# Patient Record
Sex: FEMALE | Race: WHITE | NOT HISPANIC OR LATINO | Employment: FULL TIME | URBAN - NONMETROPOLITAN AREA
[De-identification: names, ages, dates, MRNs, and addresses within clinical notes are randomized per-mention and may not be internally consistent; named-entity substitution may affect disease eponyms.]

---

## 2018-03-29 ENCOUNTER — TRANSCRIBE ORDERS (OUTPATIENT)
Dept: ADMINISTRATIVE | Facility: HOSPITAL | Age: 58
End: 2018-03-29

## 2018-03-29 DIAGNOSIS — Z12.39 SCREENING BREAST EXAMINATION: Primary | ICD-10-CM

## 2018-06-25 ENCOUNTER — HOSPITAL ENCOUNTER (OUTPATIENT)
Dept: MAMMOGRAPHY | Facility: HOSPITAL | Age: 58
Discharge: HOME | End: 2018-06-25
Admitting: NURSE PRACTITIONER

## 2018-06-25 DIAGNOSIS — Z12.39 SCREENING BREAST EXAMINATION: ICD-10-CM

## 2018-06-25 PROCEDURE — 77063 BREAST TOMOSYNTHESIS BI: CPT

## 2018-06-25 PROCEDURE — 77067 SCR MAMMO BI INCL CAD: CPT

## 2019-04-01 ENCOUNTER — OFFICE VISIT (OUTPATIENT)
Dept: SURGERY | Facility: CLINIC | Age: 59
End: 2019-04-01

## 2019-04-01 VITALS
TEMPERATURE: 98.2 F | SYSTOLIC BLOOD PRESSURE: 156 MMHG | BODY MASS INDEX: 38.89 KG/M2 | HEIGHT: 66 IN | WEIGHT: 242 LBS | OXYGEN SATURATION: 97 % | HEART RATE: 112 BPM | DIASTOLIC BLOOD PRESSURE: 106 MMHG

## 2019-04-01 DIAGNOSIS — K62.5 RECTAL BLEED: Primary | ICD-10-CM

## 2019-04-01 PROCEDURE — 99204 OFFICE O/P NEW MOD 45 MIN: CPT | Performed by: SURGERY

## 2019-04-01 RX ORDER — LORATADINE 10 MG/1
10 TABLET ORAL DAILY
COMMUNITY

## 2019-04-01 RX ORDER — POLYETHYLENE GLYCOL 3350 17 G/17G
238 POWDER, FOR SOLUTION ORAL ONCE
Qty: 14 PACKET | Refills: 0 | Status: SHIPPED | OUTPATIENT
Start: 2019-04-01 | End: 2019-04-01

## 2019-04-01 RX ORDER — BISACODYL 5 MG/1
5 TABLET, DELAYED RELEASE ORAL DAILY
Qty: 4 TABLET | Refills: 0 | Status: SHIPPED | OUTPATIENT
Start: 2019-04-01 | End: 2020-11-18

## 2019-04-01 NOTE — PROGRESS NOTES
Patient: Kellie Parks    YOB: 1960    Date: 04/01/2019    Primary Care Provider: Machelle Cooney APRN    Chief Complaint   Patient presents with   • Rectal Bleeding       SUBJECTIVE:    History of present illness: Patient here with complaint of rectal bleeding intermittently over the last month or so.  It is now worsening and is occurring daily.  Bright red blood.  It has occurred in the past as well.  She occasionally has some relative constipation.  Minimal lower abdominal pain occasionally also.  She has had a history of thrombosed external hemorrhoids but is complaining of no anal pain or mass.  She has a strong family history of colon cancer in her father and son    The following portions of the patient's history were reviewed and updated as appropriate: allergies, current medications, past family history, past medical history, past social history, past surgical history and problem list.      Review of Systems   Constitutional: Negative for chills, fever and unexpected weight change.   HENT: Negative for hearing loss, trouble swallowing and voice change.    Eyes: Negative for visual disturbance.   Respiratory: Negative for apnea, cough, chest tightness, shortness of breath and wheezing.    Cardiovascular: Negative for chest pain, palpitations and leg swelling.   Gastrointestinal: Positive for anal bleeding. Negative for abdominal distention, abdominal pain, blood in stool, constipation, diarrhea, nausea, rectal pain and vomiting.   Endocrine: Negative for cold intolerance and heat intolerance.   Genitourinary: Negative for difficulty urinating, dysuria and flank pain.   Musculoskeletal: Negative for back pain and gait problem.   Skin: Negative for color change, rash and wound.   Neurological: Negative for dizziness, syncope, speech difficulty, weakness, light-headedness, numbness and headaches.   Hematological: Negative for adenopathy. Does not bruise/bleed easily.   Psychiatric/Behavioral:  "Negative for confusion. The patient is not nervous/anxious.        History:  Past Medical History:   Diagnosis Date   • Acid reflux    • Arthritis    • Hypertension    • Hypothyroidism        Past Surgical History:   Procedure Laterality Date   • HYSTERECTOMY  2008       Family History   Problem Relation Age of Onset   • Arthritis Mother    • Osteoporosis Mother    • Hypertension Mother    • Prostate cancer Father 76         with bone mets   • Hypertension Father    • Colon cancer Father 75   • Colon cancer Son 17   • Kidney failure Son        Social History     Tobacco Use   • Smoking status: Never Smoker   Substance Use Topics   • Alcohol use: No   • Drug use: No       Allergies:  No Known Allergies    Medications:    Current Outpatient Medications:   •  hydrochlorothiazide (MICROZIDE) 12.5 MG capsule, Take 12.5 mg by mouth daily., Disp: , Rfl:   •  levothyroxine (SYNTHROID, LEVOTHROID) 150 MCG tablet, Take 150 mcg by mouth daily., Disp: , Rfl:   •  loratadine (CLARITIN) 10 MG tablet, Take 10 mg by mouth Daily., Disp: , Rfl:     OBJECTIVE:    Vital Signs:   Vitals:    04/01/19 1332   BP: (!) 156/106   Pulse: 112   Temp: 98.2 °F (36.8 °C)   SpO2: 97%   Weight: 110 kg (242 lb)   Height: 167.6 cm (66\")       Physical Exam:   General Appearance:    Alert, cooperative, in no acute distress   Head:    Normocephalic, without obvious abnormality, atraumatic   Eyes:            Normal.  No scleral icterus.  PERRLA    Lungs:     Clear to auscultation,respirations regular, even and                  unlabored    Heart:    Regular rhythm and normal rate, normal S1 and S2, no            murmur   Abdomen:     Normal bowel sounds, no masses, no organomegaly, soft        non-tender, non-distended, no guarding,    Extremities:   Moves all extremities well, no edema, no cyanosis, no             redness   Skin:   No bleeding, bruising or rash   Neurologic:   Normal without gross deficits.   Psychiatric: No evidence of depression or " anxiety        Results Review:   None previous endoscopies were reviewed however.    Review of Systems was reviewed and confirmed as accurate today.    ASSESSMENT/PLAN:    1. Rectal bleed        Patient with rectal bleeding most likely associated with hemorrhoids.  She has a strong family history of colon cancer including her son who developed colon cancer at 17 years old.  She is concerned about cancer as well.  Her previous colonoscopy was performed about 2-1/2 years ago but had a poor prep at that time.  I offered her colonoscopy and she wishes to proceed with a colonoscopy.  She understands the procedure as well as the risks of bleeding and perforation and understands the ramifications of these potential complications and she wishes to proceed.  For now she wants to hold off on any hemorrhoid banding if indicated.  Also, recommend a fiber supplement of choice daily.    Electronically signed by Cristofer Shah MD  04/01/19 1:27 PM  Portions of this note has been scribed for Cristofer Shah MD by Leny Peoples. 4/1/2019  1:59 PM

## 2019-04-08 ENCOUNTER — OUTSIDE FACILITY SERVICE (OUTPATIENT)
Dept: SURGERY | Facility: CLINIC | Age: 59
End: 2019-04-08

## 2019-04-08 PROCEDURE — 45380 COLONOSCOPY AND BIOPSY: CPT | Performed by: SURGERY

## 2019-06-18 ENCOUNTER — TRANSCRIBE ORDERS (OUTPATIENT)
Dept: ADMINISTRATIVE | Facility: HOSPITAL | Age: 59
End: 2019-06-18

## 2019-06-18 DIAGNOSIS — Z12.39 ENCOUNTER FOR SCREENING FOR MALIGNANT NEOPLASM OF BREAST: Primary | ICD-10-CM

## 2019-09-18 DIAGNOSIS — M25.562 ARTHRALGIA OF LEFT KNEE: Primary | ICD-10-CM

## 2019-09-19 ENCOUNTER — OFFICE VISIT (OUTPATIENT)
Dept: ORTHOPEDIC SURGERY | Facility: CLINIC | Age: 59
End: 2019-09-19

## 2019-09-19 VITALS — BODY MASS INDEX: 38.89 KG/M2 | RESPIRATION RATE: 18 BRPM | HEIGHT: 66 IN | WEIGHT: 242 LBS

## 2019-09-19 DIAGNOSIS — M25.562 ARTHRALGIA OF LEFT KNEE: Primary | ICD-10-CM

## 2019-09-19 PROCEDURE — 20610 DRAIN/INJ JOINT/BURSA W/O US: CPT | Performed by: ORTHOPAEDIC SURGERY

## 2019-09-19 PROCEDURE — 99203 OFFICE O/P NEW LOW 30 MIN: CPT | Performed by: ORTHOPAEDIC SURGERY

## 2019-09-19 RX ORDER — TRIAMCINOLONE ACETONIDE 40 MG/ML
40 INJECTION, SUSPENSION INTRA-ARTICULAR; INTRAMUSCULAR
Status: COMPLETED | OUTPATIENT
Start: 2019-09-19 | End: 2019-09-19

## 2019-09-19 RX ORDER — LIDOCAINE HYDROCHLORIDE 10 MG/ML
2 INJECTION, SOLUTION EPIDURAL; INFILTRATION; INTRACAUDAL; PERINEURAL
Status: COMPLETED | OUTPATIENT
Start: 2019-09-19 | End: 2019-09-19

## 2019-09-19 RX ADMIN — LIDOCAINE HYDROCHLORIDE 2 ML: 10 INJECTION, SOLUTION EPIDURAL; INFILTRATION; INTRACAUDAL; PERINEURAL at 14:26

## 2019-09-19 RX ADMIN — TRIAMCINOLONE ACETONIDE 40 MG: 40 INJECTION, SUSPENSION INTRA-ARTICULAR; INTRAMUSCULAR at 14:26

## 2019-09-19 NOTE — PROGRESS NOTES
Subjective   Patient ID: Kellie Parks is a 59 y.o. female  Pain of the Left Knee (Patient is here today for left knee pain, she states about a year or so ago she was cleaning brush in her yard when her knee became sore. She states she had a cortisone injection @ [facility] 6 months to a year ago. 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  Occasionally she gets a burning sensation in the medial joint line most the pain is lateral with swelling above the kneecap area.      Review of Systems   Constitutional: Negative for fever.   HENT: Negative for voice change.    Eyes: Negative for visual disturbance.   Respiratory: Negative for shortness of breath.    Cardiovascular: Negative for chest pain.   Gastrointestinal: Negative for abdominal pain.   Genitourinary: Negative for dysuria.   Musculoskeletal: Positive for arthralgias. Negative for gait problem and joint swelling.   Skin: Negative for rash.   Neurological: Negative for speech difficulty.   Hematological: Does not bruise/bleed easily.   Psychiatric/Behavioral: Negative for confusion.       Past Medical History:   Diagnosis Date   • Acid reflux    • Arthritis    • Hypertension    • Hypothyroidism         Past Surgical History:   Procedure Laterality Date   • HYSTERECTOMY  2008       Family History   Problem Relation Age of Onset   • Arthritis Mother    • Osteoporosis Mother    • Hypertension Mother    • Prostate cancer Father 76         with bone mets   • Hypertension Father    • Colon cancer Father 75   • Colon cancer Son 17   • Kidney failure Son        Social History     Socioeconomic History   • Marital status:      Spouse name: Not on file   • Number of children: Not on file   •  "Years of education: Not on file   • Highest education level: Not on file   Tobacco Use   • Smoking status: Never Smoker   • Smokeless tobacco: Never Used   Substance and Sexual Activity   • Alcohol use: No   • Drug use: No   • Sexual activity: Defer       I have reviewed the above medical and surgical history, family history, social history, medications, allergies and review of systems.    No Known Allergies      Current Outpatient Medications:   •  bisacodyl (bisacodyl) 5 MG EC tablet, Take 1 tablet by mouth Daily., Disp: 4 tablet, Rfl: 0  •  hydrochlorothiazide (MICROZIDE) 12.5 MG capsule, Take 12.5 mg by mouth daily., Disp: , Rfl:   •  levothyroxine (SYNTHROID, LEVOTHROID) 150 MCG tablet, Take 150 mcg by mouth daily., Disp: , Rfl:   •  loratadine (CLARITIN) 10 MG tablet, Take 10 mg by mouth Daily., Disp: , Rfl:     Objective   Resp 18   Ht 167.6 cm (66\")   Wt 110 kg (242 lb)   BMI 39.06 kg/m²    Physical Exam  Constitutional: Patient is oriented to person, place, and time. Patient appears well-developed and well-nourished.   HENT:Head: Normocephalic and atraumatic.   Eyes: EOM are normal. Pupils are equal, round, and reactive to light.   Neck: Normal range of motion. Neck supple.   Cardiovascular: Normal rate.    Pulmonary/Chest: Effort normal and breath sounds normal.   Abdominal: Soft.   Neurological: Patient is alert and oriented to person, place, and time.   Skin: Skin is warm and dry.   Psychiatric: Patient has a normal mood and affect.   Nursing note and vitals reviewed.       [unfilled]   Left knee: 2+ effusion no erythema slight tenderness at the inferolateral patellofemoral area minimal tenderness iliotibial band no swelling or bruising noted at the iliotibial band.  Positive patellofemoral apprehension sign.  Ligament exam unremarkable Nilson sign negative for medial joint line pain calf supple straight leg raising negative for lower leg paresthesias.    Assessment/Plan   Review of Radiographic " Studies:    Radiographic images today of affected area I personally viewed and showed no sign of acute fracture or dislocation.      Large Joint Arthrocentesis: L knee  Date/Time: 9/19/2019 2:26 PM  Consent given by: patient  Site marked: site marked  Timeout: Immediately prior to procedure a time out was called to verify the correct patient, procedure, equipment, support staff and site/side marked as required   Supporting Documentation  Indications: pain   Procedure Details  Location: knee - L knee  Preparation: Patient was prepped and draped in the usual sterile fashion  Needle size: 22 G  Approach: anterolateral  Medications administered: 2 mL lidocaine PF 1% 1 %; 40 mg triamcinolone acetonide 40 MG/ML  Patient tolerance: patient tolerated the procedure well with no immediate complications           Vula was seen today for pain.    Diagnoses and all orders for this visit:    Arthralgia of left knee  -     MRI Knee Right Without Contrast  -     Large Joint Arthrocentesis: L knee       Orthopedic activities reviewed and patient expressed appreciation and Using illustrations and models, the nature of the pathology was explained to the patient      Recommendations/Plan:   Test/Studies: MR left knee    Patient agreeable to call or return sooner for any concerns.             Impression:  Left anterolateral knee pain probable chondromalacia patella rule out lateral meniscal tear  Plan:  MR left knee icing use of anti-inflammatories home exercise consider surgical treatment pending MR findings and response to steroid injection

## 2019-09-26 ENCOUNTER — HOSPITAL ENCOUNTER (OUTPATIENT)
Dept: MRI IMAGING | Facility: HOSPITAL | Age: 59
Discharge: HOME | End: 2019-09-26
Admitting: ORTHOPAEDIC SURGERY

## 2019-09-26 PROCEDURE — 73721 MRI JNT OF LWR EXTRE W/O DYE: CPT

## 2019-10-03 ENCOUNTER — OFFICE VISIT (OUTPATIENT)
Dept: ORTHOPEDIC SURGERY | Facility: CLINIC | Age: 59
End: 2019-10-03

## 2019-10-03 VITALS — HEIGHT: 66 IN | RESPIRATION RATE: 18 BRPM | WEIGHT: 242 LBS | BODY MASS INDEX: 38.89 KG/M2

## 2019-10-03 DIAGNOSIS — M17.10 ARTHRITIS OF KNEE: Primary | ICD-10-CM

## 2019-10-03 PROCEDURE — 99213 OFFICE O/P EST LOW 20 MIN: CPT | Performed by: ORTHOPAEDIC SURGERY

## 2019-10-03 NOTE — PROGRESS NOTES
Subjective   Patient ID: Kellie Parks is a 59 y.o. female  Results of the Left Knee (MRI @ [facility] )             History of Present Illness  She returns status post MR left knee which showed multiple loose bodies with large Baker's cyst degenerative tears medial meniscus and lateral meniscus consistent with osteoarthritis cartilage loss as well.  Minimal improvement after steroid injection last visit, unable to tolerate anti-inflammatories due to elevation in her serum creatinine which is been followed by her PCP.  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  Plans to consider early USP would not like to pursue knee arthroplasty options at this time      Review of Systems   Constitutional: Negative for diaphoresis, fever and unexpected weight change.   HENT: Negative for dental problem and sore throat.    Eyes: Negative for visual disturbance.   Respiratory: Negative for shortness of breath.    Cardiovascular: Negative for chest pain.   Gastrointestinal: Negative for abdominal pain, constipation, diarrhea, nausea and vomiting.   Genitourinary: Negative for difficulty urinating and frequency.   Neurological: Negative for headaches.   Hematological: Does not bruise/bleed easily.   All other systems reviewed and are negative.      Past Medical History:   Diagnosis Date   • Acid reflux    • Arthritis    • Hypertension    • Hypothyroidism         Past Surgical History:   Procedure Laterality Date   • HYSTERECTOMY  2008       Family History   Problem Relation Age of Onset   • Arthritis Mother    • Osteoporosis Mother    • Hypertension Mother    • Prostate cancer Father 76         with bone mets   • Hypertension Father    • Colon cancer Father 75   • Colon cancer Son 17   • Kidney failure Son        Social History     Socioeconomic History   • Marital status:      Spouse name: Not on file   • Number of children: Not on  "file   • Years of education: Not on file   • Highest education level: Not on file   Tobacco Use   • Smoking status: Never Smoker   • Smokeless tobacco: Never Used   Substance and Sexual Activity   • Alcohol use: No   • Drug use: No   • Sexual activity: Defer       I have reviewed the above medical and surgical history, family history, social history, medications, allergies and review of systems.    No Known Allergies      Current Outpatient Medications:   •  bisacodyl (bisacodyl) 5 MG EC tablet, Take 1 tablet by mouth Daily., Disp: 4 tablet, Rfl: 0  •  hydrochlorothiazide (MICROZIDE) 12.5 MG capsule, Take 12.5 mg by mouth daily., Disp: , Rfl:   •  levothyroxine (SYNTHROID, LEVOTHROID) 150 MCG tablet, Take 150 mcg by mouth daily., Disp: , Rfl:   •  loratadine (CLARITIN) 10 MG tablet, Take 10 mg by mouth Daily., Disp: , Rfl:     Objective   Resp 18   Ht 167.6 cm (66\")   Wt 110 kg (242 lb)   BMI 39.06 kg/m²    Physical Exam  Constitutional: Patient is oriented to person, place, and time. Patient appears well-developed and well-nourished.   HENT:Head: Normocephalic and atraumatic.   Eyes: EOM are normal. Pupils are equal, round, and reactive to light.   Neck: Normal range of motion. Neck supple.   Cardiovascular: Normal rate.    Pulmonary/Chest: Effort normal and breath sounds normal.   Abdominal: Soft.   Neurological: Patient is alert and oriented to person, place, and time.   Skin: Skin is warm and dry.   Psychiatric: Patient has a normal mood and affect.   Nursing note and vitals reviewed.       [unfilled]   Left knee: 2+ effusion positive tenderness inferomedial inferolateral patellofemoral area positive patellofemoral crepitus, minimal pain quadriceps tendon, skin appears normal minimal varicosities palpable Baker's cyst posteriorly medial and lateral joint line pain a vague nature Nilson sign does not elicit acute medial or lateral joint line pain calf supple neurovascularly intact ligament exam " unremarkable.        Assessment/Plan   Review of Radiographic Studies:    No new imaging done today.  MR study left knee was directly examined and demonstrated to the patient multiple findings as detailed in radiology report      Procedures     Kellie was seen today for results.    Diagnoses and all orders for this visit:    Arthritis of knee       Orthopedic activities reviewed and patient expressed appreciation, Risk, benefits, and merits of treatment alternatives reviewed with the patient and questions answered, Using illustrations and models, the nature of the pathology was explained to the patient and Use brace as instructed      Recommendations/Plan:   Work/Activity Status: May perform usual activities as tolerated    Patient agreeable to call or return sooner for any concerns.         I reviewed the patient's radiographs indicating advanced osteoarthritis discussed the natural history treatment options and pros and cons and risks and complications of surgical and nonsurgical care.  I also reviewed advantages and disadvantages risks and complications of steroid injections versus viscus gel injections.  The patient had opportunity to ask questions which were answered.    Discussed and reviewed indications risks complications and procedural details of total knee arthroplasty as an option for surgical reconstruction.  Given advanced arthritic changes did not recommend arthroscopic treatment    Impression:  Left knee osteoarthritis multiple loose bodies large Baker's cyst degenerative tears medial and lateral meniscus without acute mechanical medial lateral joint line pain  Plan:  Return for viscous gel series injections

## 2019-10-07 DIAGNOSIS — M17.12 PRIMARY LOCALIZED OSTEOARTHRITIS OF LEFT KNEE: Primary | ICD-10-CM

## 2019-10-26 ENCOUNTER — LAB (OUTPATIENT)
Dept: LAB | Facility: HOSPITAL | Age: 59
End: 2019-10-26

## 2019-10-26 ENCOUNTER — TRANSCRIBE ORDERS (OUTPATIENT)
Dept: LAB | Facility: HOSPITAL | Age: 59
End: 2019-10-26

## 2019-10-26 DIAGNOSIS — N18.30 CHRONIC KIDNEY DISEASE, STAGE III (MODERATE) (HCC): ICD-10-CM

## 2019-10-26 DIAGNOSIS — N18.30 CHRONIC KIDNEY DISEASE, STAGE III (MODERATE) (HCC): Primary | ICD-10-CM

## 2019-10-26 LAB
ALBUMIN SERPL-MCNC: 4.3 G/DL (ref 3.5–5.2)
ANION GAP SERPL CALCULATED.3IONS-SCNC: 9.9 MMOL/L (ref 5–15)
BUN BLD-MCNC: 20 MG/DL (ref 8–23)
BUN/CREAT SERPL: 13.9 (ref 7–25)
CALCIUM SPEC-SCNC: 9.5 MG/DL (ref 8.6–10.5)
CHLORIDE SERPL-SCNC: 98 MMOL/L (ref 98–107)
CO2 SERPL-SCNC: 31.1 MMOL/L (ref 22–29)
CREAT BLD-MCNC: 1.44 MG/DL (ref 0.57–1)
DEPRECATED RDW RBC AUTO: 39.4 FL (ref 37–54)
ERYTHROCYTE [DISTWIDTH] IN BLOOD BY AUTOMATED COUNT: 12.2 % (ref 12.3–15.4)
GFR SERPL CREATININE-BSD FRML MDRD: 37 ML/MIN/1.73
GLUCOSE BLD-MCNC: 98 MG/DL (ref 65–99)
HCT VFR BLD AUTO: 37.4 % (ref 34–46.6)
HGB BLD-MCNC: 12.7 G/DL (ref 12–15.9)
MCH RBC QN AUTO: 30.3 PG (ref 26.6–33)
MCHC RBC AUTO-ENTMCNC: 34 G/DL (ref 31.5–35.7)
MCV RBC AUTO: 89.3 FL (ref 79–97)
PHOSPHATE SERPL-MCNC: 4.7 MG/DL (ref 2.5–4.5)
PLATELET # BLD AUTO: 354 10*3/MM3 (ref 140–450)
PMV BLD AUTO: 9.6 FL (ref 6–12)
POTASSIUM BLD-SCNC: 4 MMOL/L (ref 3.5–5.2)
PTH-INTACT SERPL-MCNC: 43.4 PG/ML (ref 15–65)
RBC # BLD AUTO: 4.19 10*6/MM3 (ref 3.77–5.28)
SODIUM BLD-SCNC: 139 MMOL/L (ref 136–145)
URATE SERPL-MCNC: 7.2 MG/DL (ref 2.4–5.7)
WBC NRBC COR # BLD: 6.7 10*3/MM3 (ref 3.4–10.8)

## 2019-10-26 PROCEDURE — 83970 ASSAY OF PARATHORMONE: CPT

## 2019-10-26 PROCEDURE — 84550 ASSAY OF BLOOD/URIC ACID: CPT

## 2019-10-26 PROCEDURE — 85027 COMPLETE CBC AUTOMATED: CPT

## 2019-10-26 PROCEDURE — 80069 RENAL FUNCTION PANEL: CPT

## 2019-10-26 PROCEDURE — 36415 COLL VENOUS BLD VENIPUNCTURE: CPT

## 2020-05-19 ENCOUNTER — TRANSCRIBE ORDERS (OUTPATIENT)
Dept: ADMINISTRATIVE | Facility: HOSPITAL | Age: 60
End: 2020-05-19

## 2020-05-19 DIAGNOSIS — Z12.31 BREAST CANCER SCREENING BY MAMMOGRAM: Primary | ICD-10-CM

## 2020-09-11 ENCOUNTER — TRANSCRIBE ORDERS (OUTPATIENT)
Dept: ADMINISTRATIVE | Facility: HOSPITAL | Age: 60
End: 2020-09-11

## 2020-09-11 DIAGNOSIS — Z12.31 ENCOUNTER FOR SCREENING MAMMOGRAM FOR BREAST CANCER: Primary | ICD-10-CM

## 2020-10-20 ENCOUNTER — HOSPITAL ENCOUNTER (OUTPATIENT)
Dept: MAMMOGRAPHY | Facility: HOSPITAL | Age: 60
Discharge: HOME | End: 2020-10-20
Admitting: NURSE PRACTITIONER

## 2020-10-20 DIAGNOSIS — Z12.31 ENCOUNTER FOR SCREENING MAMMOGRAM FOR BREAST CANCER: ICD-10-CM

## 2020-10-20 PROCEDURE — 77063 BREAST TOMOSYNTHESIS BI: CPT

## 2020-10-20 PROCEDURE — 77067 SCR MAMMO BI INCL CAD: CPT

## 2020-11-18 ENCOUNTER — PROCEDURE VISIT (OUTPATIENT)
Dept: OBSTETRICS AND GYNECOLOGY | Facility: CLINIC | Age: 60
End: 2020-11-18

## 2020-11-18 VITALS
HEIGHT: 66 IN | BODY MASS INDEX: 37.45 KG/M2 | WEIGHT: 233 LBS | SYSTOLIC BLOOD PRESSURE: 128 MMHG | DIASTOLIC BLOOD PRESSURE: 82 MMHG

## 2020-11-18 DIAGNOSIS — Z90.710 HISTORY OF HYSTERECTOMY WITH BILATERAL OOPHORECTOMY: ICD-10-CM

## 2020-11-18 DIAGNOSIS — Z79.890 HORMONE REPLACEMENT THERAPY (HRT): ICD-10-CM

## 2020-11-18 DIAGNOSIS — Z01.419 WELL WOMAN EXAM WITH ROUTINE GYNECOLOGICAL EXAM: Primary | ICD-10-CM

## 2020-11-18 DIAGNOSIS — Z90.722 HISTORY OF HYSTERECTOMY WITH BILATERAL OOPHORECTOMY: ICD-10-CM

## 2020-11-18 PROCEDURE — 99386 PREV VISIT NEW AGE 40-64: CPT | Performed by: OBSTETRICS & GYNECOLOGY

## 2020-11-18 RX ORDER — SIMVASTATIN 20 MG
20 TABLET ORAL DAILY
COMMUNITY
Start: 2020-11-09

## 2020-11-18 RX ORDER — LOSARTAN POTASSIUM 50 MG/1
TABLET ORAL
COMMUNITY
Start: 2020-11-04 | End: 2021-03-18

## 2020-11-23 ENCOUNTER — HOSPITAL ENCOUNTER (EMERGENCY)
Facility: HOSPITAL | Age: 60
Discharge: HOME | End: 2020-11-23
Attending: EMERGENCY MEDICINE | Admitting: EMERGENCY MEDICINE

## 2020-11-23 ENCOUNTER — APPOINTMENT (OUTPATIENT)
Dept: GENERAL RADIOLOGY | Facility: HOSPITAL | Age: 60
End: 2020-11-23

## 2020-11-23 ENCOUNTER — READMISSION MANAGEMENT (OUTPATIENT)
Dept: CALL CENTER | Facility: HOSPITAL | Age: 60
End: 2020-11-23

## 2020-11-23 VITALS
BODY MASS INDEX: 36.16 KG/M2 | TEMPERATURE: 99 F | OXYGEN SATURATION: 97 % | HEART RATE: 85 BPM | SYSTOLIC BLOOD PRESSURE: 107 MMHG | WEIGHT: 225 LBS | DIASTOLIC BLOOD PRESSURE: 84 MMHG | RESPIRATION RATE: 18 BRPM | HEIGHT: 66 IN

## 2020-11-23 DIAGNOSIS — U07.1 COVID-19: Primary | ICD-10-CM

## 2020-11-23 PROBLEM — Z79.890 HORMONE REPLACEMENT THERAPY (HRT): Status: ACTIVE | Noted: 2020-11-23

## 2020-11-23 LAB — SARS-COV-2 RNA PNL SPEC NAA+PROBE: DETECTED

## 2020-11-23 PROCEDURE — 87635 SARS-COV-2 COVID-19 AMP PRB: CPT | Performed by: EMERGENCY MEDICINE

## 2020-11-23 PROCEDURE — 71045 X-RAY EXAM CHEST 1 VIEW: CPT

## 2020-11-23 PROCEDURE — 99283 EMERGENCY DEPT VISIT LOW MDM: CPT

## 2020-11-23 RX ORDER — PREDNISONE 50 MG/1
50 TABLET ORAL DAILY
Qty: 5 TABLET | Refills: 0 | Status: SHIPPED | OUTPATIENT
Start: 2020-11-23 | End: 2020-11-28

## 2020-11-23 RX ORDER — AZITHROMYCIN 250 MG/1
250 TABLET, FILM COATED ORAL DAILY
Qty: 6 TABLET | Refills: 0 | Status: SHIPPED | OUTPATIENT
Start: 2020-11-23 | End: 2021-03-18

## 2020-11-23 RX ORDER — ALBUTEROL SULFATE 90 UG/1
2 AEROSOL, METERED RESPIRATORY (INHALATION) EVERY 4 HOURS PRN
Qty: 6.7 G | Refills: 0 | Status: SHIPPED | OUTPATIENT
Start: 2020-11-23

## 2020-11-23 RX ORDER — ONDANSETRON 4 MG/1
4 TABLET, ORALLY DISINTEGRATING ORAL EVERY 6 HOURS PRN
Qty: 8 TABLET | Refills: 0 | Status: SHIPPED | OUTPATIENT
Start: 2020-11-23 | End: 2020-11-25

## 2020-11-23 NOTE — PROGRESS NOTES
Subjective   Chief Complaint   Patient presents with   • Gynecologic Exam     Last pap 4 years WNL, MMG done last month. C/O hot flashes, abnormal weight gain     Vula MISTY Parks is a 60 y.o. year old  presenting to be seen for an annual well woman visit.    She reports hot flashes, night sweats, weight gain.  Previous hysterectomy + BSO in  with Dr. Hamilton.  She is not a smoker.  No history of blood clots, MI or stroke.    She denies sexual dysfunction. She denies urinary complaints.    OB Hx: 3 term vaginal births  Contraception: Not applicable  Pap smear: 4 years ago?  No history of cervical dysplasia  Mammogram: 2020 - [facility] 2  Colonoscopy:   DEXA Scan: Not sure    She denies nausea, emesis, fevers, chills, hair/skin/nail changes, dysuria, urinary frequency, palpitations, chest pain, headaches, myalgia, dyspnea.     History  Past Medical History:   Diagnosis Date   • Acid reflux    • Arthritis    • Hyperlipidemia    • Hypertension    • Hypothyroidism    • Osteoarthritis    ,   Past Surgical History:   Procedure Laterality Date   • HYSTERECTOMY     ,   Family History   Problem Relation Age of Onset   • Arthritis Mother    • Osteoporosis Mother    • Hypertension Mother    • Prostate cancer Father 76         with bone mets   • Hypertension Father    • Colon cancer Father 75   • Colon cancer Son 17   • Kidney failure Son    ,   Social History     Tobacco Use   • Smoking status: Never Smoker   • Smokeless tobacco: Never Used   Substance Use Topics   • Alcohol use: No   • Drug use: No   , (Not in a hospital admission)   and Allergies:  Patient has no known allergies.    Current Outpatient Medications on File Prior to Visit   Medication Sig Dispense Refill   • hydrochlorothiazide (MICROZIDE) 12.5 MG capsule Take 12.5 mg by mouth daily.     • levothyroxine (SYNTHROID, LEVOTHROID) 150 MCG tablet Take 150 mcg by mouth daily.     • loratadine (CLARITIN) 10 MG tablet Take 10 mg by mouth Daily.     •  "losartan (COZAAR) 50 MG tablet TAKE 1 TABLET BY MOUTH ONE TIME A DAY ALONG WITH THE HYDROCHLOROTHIAZIDE 12.5MG TABLET.     • simvastatin (ZOCOR) 20 MG tablet Take 20 mg by mouth Daily.       No current facility-administered medications on file prior to visit.        Social History    Tobacco Use      Smoking status: Never Smoker      Smokeless tobacco: Never Used      Review of Systems  Pertinent items are noted in HPI, all other systems were reviewed and negative       Objective   /82   Ht 167.6 cm (66\")   Wt 106 kg (233 lb)   BMI 37.61 kg/m²     Physical Exam:  General Appearance: alert, pleasant, appears stated age, interactive and cooperative  Head: normocephalic, without obvious abnormality and atraumatic  Eyes: lids and lashes normal and no icterus  Ears: ears appear intact with no abnormalities noted  Nose: nares normal, septum midline, mucosa normal and no drainage  Neck: suppple, trachea midline and no thyromegaly  Back: no kyphosis present, no scoliosis present and range of motion normal  Lungs: respirations regular, respirations even and respirations unlabored, clear to auscultation bilaterally   Heart: regular rhythm and normal rate, normal S1, S2, no murmur, gallop, or rubs and no click  Breasts: Examined in supine position  Symmetric without masses or skin dimpling  Nipples normal without inversion, lesions or discharge  There are no palpable axillary nodes  Abdomen: no masses, no hepatomegaly, no splenomegaly, soft non-tender, no guarding and no rebound tenderness  Extremities: moves extremities well, no edema, no cyanosis and no redness  Skin: no bleeding, bruising or rash and no lesions noted  Lymph Nodes: no palpable adenopathy  Neurologic: Cranial Nerves cranial nerves 2 - 12 grossly intact, Speech normal content and execusion, Coordination normal  Psych: normal mood and affect, oriented to person, time and place, thought content organized and appropriate judgment    Pelvis:  Pelvic: " Clinical staff was present for exam  External genitalia:  normal appearance of the external genitalia including Bartholin's and Steger's glands.  :  urethral meatus normal;  Vagina:  atrophic mucosal changes are present; vaginal cuff intact.  Cervix:  absent.  Uterus:  absent.  Adnexa:  absent, bilateral.  Rectal:  digital rectal exam not performed; anus visually normal appearing.       Assessment   Annual well woman exam with age appropriate screening  Hormone replacement therapy     Plan    No orders of the defined types were placed in this encounter.    Medications ordered: Climara patches - low dose    Procedures performed: none    - Mammogram: Not indicated   - Pap screening guidelines reviewed; pap smear not indicated   - Yearly clinical breast and pelvic exams recommended regardless of pap recommendations  - Dexa scan: not indicated   - Colonoscopy: not indicated   - Healthy diet and exercise encouraged  - Calcium and Vitamin D requirements reviewed  - Contraception: N/A  - Screening: none  - Start low-dose estrogen patches.  We discussed a short duration of low-dose therapy.    Follow up for annual visits    James Alvarado MD  Obstetrics and Gynecology  [facility]

## 2020-11-23 NOTE — OUTREACH NOTE
Prep Survey      Responses   [facility] facility patient discharged from?  [facility]   Is LACE score < 7 ?  Yes   Eligibility  Readm Mgmt   Discharge diagnosis  Covid 19   Does the patient have one of the following disease processes/diagnoses(primary or secondary)?  COVID-19   Does the patient have Home health ordered?  No   Is there a DME ordered?  Yes   What DME was ordered?  sent home and advised to obtain pulse ox   General alerts for this patient  ED discharge call x 3 only   Prep survey completed?  Yes          Dorcas Villarreal RN

## 2020-11-24 ENCOUNTER — READMISSION MANAGEMENT (OUTPATIENT)
Dept: CALL CENTER | Facility: HOSPITAL | Age: 60
End: 2020-11-24

## 2020-11-24 NOTE — OUTREACH NOTE
COVID-19 Week 1 Survey      Responses   [facility] patient discharged from?  [facility]   Does the patient have one of the following disease processes/diagnoses(primary or secondary)?  COVID-19   COVID-19 underlying condition?  None   Call Number  Call 1   Week 1 Call successful?  Yes   Call start time  [phone]   Call end time  [phone]   General alerts for this patient  ED discharge call x 3 only   Discharge diagnosis  Covid 19   Meds reviewed with patient/caregiver?  Yes   Is the patient having any side effects they believe may be caused by any medication additions or changes?  No   Does the patient have all medications ordered at discharge?  Yes   Is the patient taking all medications as directed (includes completed medication regime)?  Yes   Does the patient have a primary care provider?   Yes   Does the patient have an appointment with their PCP or specialist within 7 days of discharge?  Greater than 7 days   Has the patient kept scheduled appointments due by today?  N/A   What DME was ordered?  sent home and gave pulse ox   Psychosocial issues?  No   Did the patient receive a copy of their discharge instructions?  Yes   Did the patient receive a copy of COVID-19 specific instructions?  Yes   Nursing interventions  Reviewed instructions with patient   What is the patient's perception of their health status since discharge?  Same   Does the patient have any of the following symptoms?  Cough, Shortness of breath   Nursing Interventions  Nurse provided patient education   Pulse Ox monitoring  Intermittent   Pulse Ox device source  Patient   O2 Sat comments  96% this morning   Is the patient/caregiver able to teach back steps to recovery at home?  Set small, achievable goals for return to baseline health   If the patient is a current smoker, are they able to teach back resources for cessation?  Not a smoker   Is the patient/caregiver able to teach back the hierarchy of who to call/visit for symptoms/problems? PCP,  Specialist, Home health nurse, Urgent Care, ED, 911  Yes   COVID-19 call completed?  Yes   Wrap up additional comments  pt is doing ok, picked up meds and got a pulse ox from the ER. has cough and sob when moving around. daughter also has it.          Bella Whitmore, RN

## 2020-11-25 ENCOUNTER — READMISSION MANAGEMENT (OUTPATIENT)
Dept: CALL CENTER | Facility: HOSPITAL | Age: 60
End: 2020-11-25

## 2020-11-25 NOTE — OUTREACH NOTE
COVID-19 Week 1 Survey      Responses   [facility] patient discharged from?  [facility]   Does the patient have one of the following disease processes/diagnoses(primary or secondary)?  COVID-19   COVID-19 underlying condition?  None   Call Number  Call 2   Week 1 Call successful?  Yes   Call start time  1150   Call end time  1152   General alerts for this patient  ED discharge call x 3 only   Discharge diagnosis  Covid 19   Meds reviewed with patient/caregiver?  Yes   Is the patient having any side effects they believe may be caused by any medication additions or changes?  No   Does the patient have all medications ordered at discharge?  Yes   Is the patient taking all medications as directed (includes completed medication regime)?  Yes   Does the patient have a primary care provider?   Yes   Does the patient have an appointment with their PCP or specialist within 7 days of discharge?  Greater than 7 days   Has the patient kept scheduled appointments due by today?  N/A   What DME was ordered?  sent home and gave pulse ox   Psychosocial issues?  No   Did the patient receive a copy of their discharge instructions?  Yes   Did the patient receive a copy of COVID-19 specific instructions?  Yes   Nursing interventions  Reviewed instructions with patient   What is the patient's perception of their health status since discharge?  Improving   Does the patient have any of the following symptoms?  Cough, Shortness of breath   Pulse Ox monitoring  Intermittent   Pulse Ox device source  Patient   O2 Sat comments  98%   O2 Sat: education provided  Sat levels, Monitoring frequency, When to seek care   Is the patient/caregiver able to teach back steps to recovery at home?  Set small, achievable goals for return to baseline health   If the patient is a current smoker, are they able to teach back resources for cessation?  Not a smoker   Is the patient/caregiver able to teach back the hierarchy of who to call/visit for  symptoms/problems? PCP, Specialist, Home health nurse, Urgent Care, ED, 911  Yes   COVID-19 call completed?  Yes   Wrap up additional comments  doing better everyday, no questions, concerns.          Bella Whitmore, RN

## 2020-11-26 ENCOUNTER — READMISSION MANAGEMENT (OUTPATIENT)
Dept: CALL CENTER | Facility: HOSPITAL | Age: 60
End: 2020-11-26

## 2020-11-26 NOTE — OUTREACH NOTE
COVID-19 Week 1 Survey      Responses   [facility] patient discharged from?  [facility]   Does the patient have one of the following disease processes/diagnoses(primary or secondary)?  COVID-19   COVID-19 underlying condition?  None   Call Number  Call 3   Week 1 Call successful?  No   Discharge diagnosis  Covid 19   Revoked  No further contact(revokes)-requires comment [E$R x 3 calls]   Graduated/Revoked comments  ER visit 3 calls          eBlla Whitmore RN

## 2021-03-18 ENCOUNTER — OFFICE VISIT (OUTPATIENT)
Dept: ORTHOPEDIC SURGERY | Facility: CLINIC | Age: 61
End: 2021-03-18

## 2021-03-18 VITALS — TEMPERATURE: 97.3 F | HEIGHT: 66 IN | BODY MASS INDEX: 36.8 KG/M2 | WEIGHT: 229 LBS

## 2021-03-18 DIAGNOSIS — M17.12 PRIMARY LOCALIZED OSTEOARTHRITIS OF LEFT KNEE: Primary | ICD-10-CM

## 2021-03-18 DIAGNOSIS — M25.561 ARTHRALGIA OF RIGHT KNEE: ICD-10-CM

## 2021-03-18 PROCEDURE — 20610 DRAIN/INJ JOINT/BURSA W/O US: CPT | Performed by: ORTHOPAEDIC SURGERY

## 2021-03-18 PROCEDURE — 99213 OFFICE O/P EST LOW 20 MIN: CPT | Performed by: ORTHOPAEDIC SURGERY

## 2021-03-18 RX ORDER — LOSARTAN POTASSIUM AND HYDROCHLOROTHIAZIDE 12.5; 5 MG/1; MG/1
1 TABLET ORAL DAILY
COMMUNITY
Start: 2020-12-14

## 2021-03-18 RX ORDER — LIDOCAINE HYDROCHLORIDE 10 MG/ML
2 INJECTION, SOLUTION INFILTRATION; PERINEURAL
Status: COMPLETED | OUTPATIENT
Start: 2021-03-18 | End: 2021-03-18

## 2021-03-18 RX ORDER — METOPROLOL SUCCINATE 25 MG/1
25 TABLET, EXTENDED RELEASE ORAL DAILY
COMMUNITY
Start: 2020-12-14

## 2021-03-18 RX ORDER — TRIAMCINOLONE ACETONIDE 40 MG/ML
40 INJECTION, SUSPENSION INTRA-ARTICULAR; INTRAMUSCULAR
Status: COMPLETED | OUTPATIENT
Start: 2021-03-18 | End: 2021-03-18

## 2021-03-18 RX ADMIN — TRIAMCINOLONE ACETONIDE 40 MG: 40 INJECTION, SUSPENSION INTRA-ARTICULAR; INTRAMUSCULAR at 11:07

## 2021-03-18 RX ADMIN — LIDOCAINE HYDROCHLORIDE 2 ML: 10 INJECTION, SOLUTION INFILTRATION; PERINEURAL at 11:07

## 2021-03-18 NOTE — PROGRESS NOTES
Subjective   Patient ID: Kellie Parks is a 60 y.o. female  Pain of the Right Knee and Pain of the Left Knee (Discuss visco injections )             History of Present Illness  She returns with continued pain in the left knee mostly superolaterally at the patellofemoral area denies fall or injury or giving way, feels some relief wearing a brace, has gotten up to 2 months relief with steroid injections in the past.  She does recall being prescribed methotrexate for small joint arthritis in her hands worked for short period of time but has not seen a rheumatologist since then many years ago.  No locking or specific mechanical blockage to movement in her left knee that she can recall.      Review of Systems   Constitutional: Negative for fever.   HENT: Negative for dental problem and voice change.    Eyes: Negative for visual disturbance.   Respiratory: Negative for shortness of breath.    Cardiovascular: Negative for chest pain.   Gastrointestinal: Negative for abdominal pain.   Genitourinary: Negative for dysuria.   Musculoskeletal: Positive for arthralgias and joint swelling. Negative for gait problem.   Skin: Negative for rash.   Neurological: Negative for speech difficulty.   Hematological: Does not bruise/bleed easily.   Psychiatric/Behavioral: Negative for confusion.       Past Medical History:   Diagnosis Date   • Acid reflux    • Arthritis    • Hyperlipidemia    • Hypertension    • Hypothyroidism    • Osteoarthritis         Past Surgical History:   Procedure Laterality Date   • HYSTERECTOMY  2008       Family History   Problem Relation Age of Onset   • Arthritis Mother    • Osteoporosis Mother    • Hypertension Mother    • Prostate cancer Father 76         with bone mets   • Hypertension Father    • Colon cancer Father 75   • Colon cancer Son 17   • Kidney failure Son        Social History     Socioeconomic History   • Marital status:      Spouse name: Not on file   • Number of children: Not on file  "  • Years of education: Not on file   • Highest education level: Not on file   Tobacco Use   • Smoking status: Never Smoker   • Smokeless tobacco: Never Used   Vaping Use   • Vaping Use: Never used   Substance and Sexual Activity   • Alcohol use: No   • Drug use: No   • Sexual activity: Not Currently       I have reviewed the medical and surgical history, family history, social history, medications, and/or allergies, and the review of systems of this report.    No Known Allergies      Current Outpatient Medications:   •  albuterol sulfate  (90 Base) MCG/ACT inhaler, Inhale 2 puffs Every 4 (Four) Hours As Needed for Wheezing or Shortness of Air., Disp: 6.7 g, Rfl: 0  •  estradiol (CLIMARA) 0.025 MG/24HR patch, Place 1 patch on the skin as directed by provider 1 (One) Time Per Week., Disp: 4 each, Rfl: 11  •  hydrochlorothiazide (MICROZIDE) 12.5 MG capsule, Take 12.5 mg by mouth daily., Disp: , Rfl:   •  levothyroxine (SYNTHROID, LEVOTHROID) 150 MCG tablet, Take 150 mcg by mouth daily., Disp: , Rfl:   •  loratadine (CLARITIN) 10 MG tablet, Take 10 mg by mouth Daily., Disp: , Rfl:   •  losartan-hydrochlorothiazide (HYZAAR) 50-12.5 MG per tablet, Take 1 tablet by mouth Daily., Disp: , Rfl:   •  metoprolol succinate XL (TOPROL-XL) 25 MG 24 hr tablet, Take 25 mg by mouth Daily., Disp: , Rfl:   •  simvastatin (ZOCOR) 20 MG tablet, Take 20 mg by mouth Daily., Disp: , Rfl:     Objective   Temp 97.3 °F (36.3 °C)   Ht 167.6 cm (66\")   Wt 104 kg (229 lb)   BMI 36.96 kg/m²    Physical Exam  Constitutional: Patient is oriented to person, place, and time. Patient appears well-developed and well-nourished.   HENT:Head: Normocephalic and atraumatic.   Eyes: EOM are normal. Pupils are equal, round, and reactive to light.   Neck: Normal range of motion. Neck supple.   Cardiovascular: Normal rate.    Pulmonary/Chest: Effort normal and breath sounds normal.   Abdominal: Soft.   Neurological: Patient is alert and oriented to " person, place, and time.   Skin: Skin is warm and dry.   Psychiatric: Patient has a normal mood and affect.   Nursing note and vitals reviewed.       [unfilled]   Left knee: Some tenderness of the patellofemoral area superolaterally positive patellofemoral crepitus positive patellofemoral apprehension no posterior medial or posterior lateral joint line pain Nilson sign negative for medial joint line pain, overall alignment appears slight varus 2 to 3 degrees full extension full flexion no instability neurovascularly intact    Assessment/Plan   Review of Radiographic Studies:    No new imaging done today.      Large Joint Arthrocentesis: L knee  Date/Time: 3/18/2021 11:07 AM  Consent given by: patient  Site marked: site marked  Timeout: Immediately prior to procedure a time out was called to verify the correct patient, procedure, equipment, support staff and site/side marked as required   Supporting Documentation  Indications: pain   Procedure Details  Location: knee - L knee  Preparation: Patient was prepped and draped in the usual sterile fashion  Needle size: 22 G  Medications administered: 40 mg triamcinolone acetonide 40 MG/ML; 2 mL lidocaine 1 %  Patient tolerance: patient tolerated the procedure well with no immediate complications           Diagnoses and all orders for this visit:    1. Primary localized osteoarthritis of left knee (Primary)    2. Arthralgia of right knee       Orthopedic activities reviewed and patient expressed appreciation, Risk, benefits, and merits of treatment alternatives reviewed with the patient and questions answered and Using illustrations and models, the nature of the pathology was explained to the patient      Recommendations/Plan:   Exercise, medications, injections, other patient advice, and return appointment as noted.    Patient agreeable to call or return sooner for any concerns.         I reviewed the patient's radiographs indicating advanced osteoarthritis discussed the  natural history treatment options and pros and cons and risks and complications of surgical and nonsurgical care.  I also reviewed advantages and disadvantages risks and complications of steroid injections versus viscus gel injections.  The patient had opportunity to ask questions which were answered.    Her primary location of pain is anterior and anterolateral consistent with patellofemoral osteoarthritis.  Arthroscopic treatment would probably not benefit this area of pain and is not recommended at this time.  She is a candidate for total knee arthroplasty in the future should pain worsen we reviewed pros cons risk complications and indications for such treatment.    She does also demonstrate polyarthralgias and may have a component of inflammatory arthritis and I did recommend further evaluation with rheumatology specialist.    Impression:  Left knee osteoarthritis  Plan:  Return for either repeat steroid injection or viscous gel series if approved by her insurance  Evaluation with rheumatology regarding small joint arthralgias

## 2021-03-19 DIAGNOSIS — M17.12 PRIMARY LOCALIZED OSTEOARTHRITIS OF LEFT KNEE: Primary | ICD-10-CM

## 2021-03-19 DIAGNOSIS — M17.11 PRIMARY LOCALIZED OSTEOARTHRITIS OF RIGHT KNEE: ICD-10-CM

## 2021-03-29 ENCOUNTER — TRANSCRIBE ORDERS (OUTPATIENT)
Dept: ADMINISTRATIVE | Facility: HOSPITAL | Age: 61
End: 2021-03-29

## 2021-03-29 DIAGNOSIS — Z12.31 ENCOUNTER FOR SCREENING MAMMOGRAM FOR MALIGNANT NEOPLASM OF BREAST: Primary | ICD-10-CM

## 2021-04-13 DIAGNOSIS — M17.11 PRIMARY LOCALIZED OSTEOARTHRITIS OF RIGHT KNEE: ICD-10-CM

## 2021-04-13 DIAGNOSIS — M17.12 PRIMARY LOCALIZED OSTEOARTHRITIS OF LEFT KNEE: Primary | ICD-10-CM

## 2021-04-13 RX ORDER — HYALURONATE SODIUM 16.8MG/2ML
16.8 SYRINGE (ML) INTRAARTICULAR WEEKLY
Qty: 6.09 ML | Refills: 0 | Status: SHIPPED | OUTPATIENT
Start: 2021-04-13 | End: 2021-04-30 | Stop reason: RX

## 2021-04-13 RX ORDER — HYALURONATE SODIUM 16.8MG/2ML
2 SYRINGE (ML) INTRAARTICULAR WEEKLY
Qty: 12 ML | Refills: 0 | Status: CANCELLED | OUTPATIENT
Start: 2021-04-13 | End: 2021-05-19

## 2021-04-30 DIAGNOSIS — M17.12 PRIMARY LOCALIZED OSTEOARTHRITIS OF LEFT KNEE: Primary | ICD-10-CM

## 2021-04-30 RX ORDER — HYALURONATE SODIUM 16.8MG/2ML
16.8 SYRINGE (ML) INTRAARTICULAR WEEKLY
Qty: 6.09 ML | Refills: 0 | OUTPATIENT
Start: 2021-04-30 | End: 2021-05-15

## 2021-04-30 NOTE — TELEPHONE ENCOUNTER
I called pt to inform she may be getting a call from [facility] if they are able to fill her Gelsyn RX, I spoke with the pharmacist,Bebe at [facility] and gave verbal RX, they will need to reach out to her to get ok to ship to our office

## 2021-05-10 ENCOUNTER — TELEPHONE (OUTPATIENT)
Dept: ORTHOPEDIC SURGERY | Facility: CLINIC | Age: 61
End: 2021-05-10

## 2021-05-10 NOTE — TELEPHONE ENCOUNTER
Caller: JAREK MORALES     Relationship to patient: SELF    Best call back number: [phone]    Chief complaint: BILATERAL KNEE    Type of visit: INJECTION    Requested date: ASAP     If rescheduling, when is the original appointment:     Additional notes: PATIENT CALLED TO CHECK ON INJECTIONS- RECEIVED A CALL FROM THE PHARMACY ABOUT PRESCRIPTION BEING FILLED? WASN'T SURE IF THAT MEAN THE INJECTIONS WERE APPROVED- PATIENT IS REQUESTING A CALL BACK.

## 2021-05-10 NOTE — TELEPHONE ENCOUNTER
I spoke with patient, she stated she received a call from [facility] regarding knee injections, I have not received a call from [facility] to set up delivery. I will call to check status

## 2021-05-19 ENCOUNTER — TELEPHONE (OUTPATIENT)
Dept: ORTHOPEDIC SURGERY | Facility: CLINIC | Age: 61
End: 2021-05-19

## 2021-05-19 NOTE — TELEPHONE ENCOUNTER
I spoke with Nevin at [facility], Ref # [id number] who states Supartz is ok to buy and bill, no auth required, . I had originally sent RX for Gelsyn to [facility] on 4/13/21 ( when I called to get auth) who then stated I need to send to [facility], sent to [facility] 4/30/21 and was told on 5/13/21 needs to be sent to [facility] so I called [facility] again today to clarify which visco is preferred and if we can buy and bill or need to send to specialty pharmacy. Nevin stated we can buy and bill Supartz, no auth needed

## 2021-05-27 ENCOUNTER — OFFICE VISIT (OUTPATIENT)
Dept: ORTHOPEDIC SURGERY | Facility: CLINIC | Age: 61
End: 2021-05-27

## 2021-05-27 VITALS — BODY MASS INDEX: 36.8 KG/M2 | HEIGHT: 66 IN | WEIGHT: 229 LBS | TEMPERATURE: 97.3 F

## 2021-05-27 DIAGNOSIS — M17.12 PRIMARY LOCALIZED OSTEOARTHRITIS OF LEFT KNEE: Primary | ICD-10-CM

## 2021-05-27 PROCEDURE — 20610 DRAIN/INJ JOINT/BURSA W/O US: CPT | Performed by: ORTHOPAEDIC SURGERY

## 2021-05-27 NOTE — PROGRESS NOTES
"Subjective   Vula S Charly is a 60 y.o. female here today for injection therapy.    Chief Complaint   Patient presents with   • Left Knee - Injections     Supartz #1       Past Medical History:   Diagnosis Date   • Acid reflux    • Arthritis    • Hyperlipidemia    • Hypertension    • Hypothyroidism    • Osteoarthritis         Past Surgical History:   Procedure Laterality Date   • HYSTERECTOMY  2008       No Known Allergies    Objective   Temp 97.3 °F (36.3 °C)   Ht 167.6 cm (66\")   Wt 104 kg (229 lb)   BMI 36.96 kg/m²    Physical Exam    Skin exam stable with no erythema, ecchymosis or rash.  No new swelling.  No motor or sensory changes.  Distal pulse intact.    Large Joint Arthrocentesis: L knee  Date/Time: 5/27/2021 8:13 AM  Consent given by: patient  Site marked: site marked  Timeout: Immediately prior to procedure a time out was called to verify the correct patient, procedure, equipment, support staff and site/side marked as required   Supporting Documentation  Indications: pain   Procedure Details  Location: knee - L knee  Preparation: Patient was prepped and draped in the usual sterile fashion  Needle size: 22 G  Medications administered: 25 mg sodium hyaluronate 25 MG/2.5ML  Patient tolerance: patient tolerated the procedure well with no immediate complications          Assessment/Plan     No diagnosis found.    No complications of injection noted.    Discussion of orthopaedic goals and activities and patient and/or guardian expressed appreciation. Call or notify for any adverse effect from injection therapy. Ice, heat, and/or modalities as beneficial. Watch for signs and symptoms of infection.    Recommendations:  Work/Activity Status: May perform usual activities as tolerated. May return to routine exercise and physical work as tolerated. No strenuous activity.  Patient and/or guardian is encouraged to call or return for any issues or concerns.    No follow-ups on file.  Patient agreeable to call or " return sooner for any concerns.

## 2021-06-03 ENCOUNTER — OFFICE VISIT (OUTPATIENT)
Dept: ORTHOPEDIC SURGERY | Facility: CLINIC | Age: 61
End: 2021-06-03

## 2021-06-03 VITALS — BODY MASS INDEX: 36.8 KG/M2 | TEMPERATURE: 97.1 F | HEIGHT: 66 IN | WEIGHT: 229 LBS

## 2021-06-03 DIAGNOSIS — M17.12 PRIMARY LOCALIZED OSTEOARTHRITIS OF LEFT KNEE: Primary | ICD-10-CM

## 2021-06-03 PROCEDURE — 20610 DRAIN/INJ JOINT/BURSA W/O US: CPT | Performed by: ORTHOPAEDIC SURGERY

## 2021-06-03 RX ORDER — DOXYCYCLINE 100 MG/1
100 CAPSULE ORAL 2 TIMES DAILY
COMMUNITY
Start: 2021-06-01

## 2021-06-03 NOTE — PROGRESS NOTES
"Subjective   Vula S Charly is a 60 y.o. female here today for injection therapy.    Chief Complaint   Patient presents with   • Left Knee - Injections     Supartz #2       Past Medical History:   Diagnosis Date   • Acid reflux    • Arthritis    • Hyperlipidemia    • Hypertension    • Hypothyroidism    • Osteoarthritis         Past Surgical History:   Procedure Laterality Date   • HYSTERECTOMY  2008       No Known Allergies    Objective   Temp 97.1 °F (36.2 °C)   Ht 167.6 cm (66\")   Wt 104 kg (229 lb)   BMI 36.96 kg/m²    Physical Exam    Skin exam stable with no erythema, ecchymosis or rash.  No new swelling.  No motor or sensory changes.  Distal pulse intact.    Large Joint Arthrocentesis: L knee  Date/Time: 6/3/2021 9:06 AM  Consent given by: patient  Site marked: site marked  Timeout: Immediately prior to procedure a time out was called to verify the correct patient, procedure, equipment, support staff and site/side marked as required   Supporting Documentation  Indications: pain   Procedure Details  Location: knee - L knee  Preparation: Patient was prepped and draped in the usual sterile fashion  Needle size: 22 G  Medications administered: 25 mg sodium hyaluronate 25 MG/2.5ML  Patient tolerance: patient tolerated the procedure well with no immediate complications          Assessment/Plan     No diagnosis found.    No complications of injection noted.    Discussion of orthopaedic goals and activities and patient and/or guardian expressed appreciation. Call or notify for any adverse effect from injection therapy. Ice, heat, and/or modalities as beneficial. Watch for signs and symptoms of infection.    Recommendations:  Work/Activity Status: May perform usual activities as tolerated. May return to routine exercise and physical work as tolerated. No strenuous activity.  Patient and/or guardian is encouraged to call or return for any issues or concerns.    No follow-ups on file.  Patient agreeable to call or " return sooner for any concerns.

## 2021-06-10 ENCOUNTER — OFFICE VISIT (OUTPATIENT)
Dept: ORTHOPEDIC SURGERY | Facility: CLINIC | Age: 61
End: 2021-06-10

## 2021-06-10 VITALS — HEIGHT: 66 IN | WEIGHT: 229 LBS | BODY MASS INDEX: 36.8 KG/M2 | TEMPERATURE: 98.1 F

## 2021-06-10 DIAGNOSIS — M17.12 PRIMARY LOCALIZED OSTEOARTHRITIS OF LEFT KNEE: Primary | ICD-10-CM

## 2021-06-10 PROCEDURE — 20610 DRAIN/INJ JOINT/BURSA W/O US: CPT | Performed by: ORTHOPAEDIC SURGERY

## 2021-06-10 NOTE — PROGRESS NOTES
"Subjective   Vula S Charly is a 61 y.o. female here today for injection therapy.    Chief Complaint   Patient presents with   • Left Knee - Injections     Supartz #3       Past Medical History:   Diagnosis Date   • Acid reflux    • Arthritis    • Hyperlipidemia    • Hypertension    • Hypothyroidism    • Osteoarthritis         Past Surgical History:   Procedure Laterality Date   • HYSTERECTOMY  2008       No Known Allergies    Objective   Temp 98.1 °F (36.7 °C)   Ht 167.6 cm (66\")   Wt 104 kg (229 lb)   BMI 36.96 kg/m²    Physical Exam    Skin exam stable with no erythema, ecchymosis or rash.  No new swelling.  No motor or sensory changes.  Distal pulse intact.    Large Joint Arthrocentesis: L knee  Date/Time: 6/10/2021 8:21 AM  Consent given by: patient  Site marked: site marked  Timeout: Immediately prior to procedure a time out was called to verify the correct patient, procedure, equipment, support staff and site/side marked as required   Supporting Documentation  Indications: pain   Procedure Details  Location: knee - L knee  Preparation: Patient was prepped and draped in the usual sterile fashion  Needle size: 22 G  Medications administered: 25 mg sodium hyaluronate 25 MG/2.5ML  Patient tolerance: patient tolerated the procedure well with no immediate complications          Assessment/Plan     No diagnosis found.    No complications of injection noted.    Discussion of orthopaedic goals and activities and patient and/or guardian expressed appreciation. Call or notify for any adverse effect from injection therapy. Ice, heat, and/or modalities as beneficial. Watch for signs and symptoms of infection.    Recommendations:  Work/Activity Status: May perform usual activities as tolerated. May return to routine exercise and physical work as tolerated. No strenuous activity.  Patient and/or guardian is encouraged to call or return for any issues or concerns.    No follow-ups on file.  Patient agreeable to call or " return sooner for any concerns.

## 2021-06-15 ENCOUNTER — OFFICE VISIT (OUTPATIENT)
Dept: ORTHOPEDIC SURGERY | Facility: CLINIC | Age: 61
End: 2021-06-15

## 2021-06-15 VITALS — HEIGHT: 66 IN | BODY MASS INDEX: 36.8 KG/M2 | TEMPERATURE: 97.3 F | WEIGHT: 229 LBS

## 2021-06-15 DIAGNOSIS — M17.12 PRIMARY LOCALIZED OSTEOARTHRITIS OF LEFT KNEE: Primary | ICD-10-CM

## 2021-06-15 DIAGNOSIS — M25.562 ARTHRALGIA OF LEFT KNEE: ICD-10-CM

## 2021-06-15 PROCEDURE — 20610 DRAIN/INJ JOINT/BURSA W/O US: CPT | Performed by: ORTHOPAEDIC SURGERY

## 2021-06-15 NOTE — PROGRESS NOTES
"Subjective   Vula S Charly is a 61 y.o. female here today for injection therapy.    Chief Complaint   Patient presents with   • Left Knee - Injections     Supartz #4       Past Medical History:   Diagnosis Date   • Acid reflux    • Arthritis    • Hyperlipidemia    • Hypertension    • Hypothyroidism    • Osteoarthritis         Past Surgical History:   Procedure Laterality Date   • HYSTERECTOMY  2008       No Known Allergies    Objective   Temp 97.3 °F (36.3 °C)   Ht 167.6 cm (66\")   Wt 104 kg (229 lb)   BMI 36.96 kg/m²    Physical Exam    Skin exam stable with no erythema, ecchymosis or rash.  No new swelling.  No motor or sensory changes.  Distal pulse intact.    Large Joint Arthrocentesis: L knee  Date/Time: 6/15/2021 9:16 AM  Consent given by: patient  Site marked: site marked  Timeout: Immediately prior to procedure a time out was called to verify the correct patient, procedure, equipment, support staff and site/side marked as required   Supporting Documentation  Indications: pain   Procedure Details  Location: knee - L knee  Preparation: Patient was prepped and draped in the usual sterile fashion  Needle size: 22 G  Medications administered: 25 mg sodium hyaluronate 25 MG/2.5ML  Patient tolerance: patient tolerated the procedure well with no immediate complications          Assessment/Plan     No diagnosis found.    No complications of injection noted.    Discussion of orthopaedic goals and activities and patient and/or guardian expressed appreciation. Call or notify for any adverse effect from injection therapy. Ice, heat, and/or modalities as beneficial. Watch for signs and symptoms of infection.    Recommendations:  Work/Activity Status: May perform usual activities as tolerated. May return to routine exercise and physical work as tolerated. No strenuous activity.  Patient and/or guardian is encouraged to call or return for any issues or concerns.    No follow-ups on file.  Patient agreeable to call or " return sooner for any concerns.

## 2021-06-24 ENCOUNTER — OFFICE VISIT (OUTPATIENT)
Dept: ORTHOPEDIC SURGERY | Facility: CLINIC | Age: 61
End: 2021-06-24

## 2021-06-24 VITALS
WEIGHT: 229 LBS | DIASTOLIC BLOOD PRESSURE: 64 MMHG | BODY MASS INDEX: 36.8 KG/M2 | TEMPERATURE: 97.3 F | HEIGHT: 66 IN | SYSTOLIC BLOOD PRESSURE: 124 MMHG

## 2021-06-24 DIAGNOSIS — M25.562 ARTHRALGIA OF LEFT KNEE: ICD-10-CM

## 2021-06-24 DIAGNOSIS — M17.12 PRIMARY LOCALIZED OSTEOARTHRITIS OF LEFT KNEE: Primary | ICD-10-CM

## 2021-06-24 PROCEDURE — 20610 DRAIN/INJ JOINT/BURSA W/O US: CPT | Performed by: ORTHOPAEDIC SURGERY

## 2021-06-24 NOTE — PROGRESS NOTES
"Subjective   Vula S Charly is a 61 y.o. female here today for injection therapy.    Chief Complaint   Patient presents with   • Left Knee - Injections     Supartz #5       Past Medical History:   Diagnosis Date   • Acid reflux    • Arthritis    • Hyperlipidemia    • Hypertension    • Hypothyroidism    • Osteoarthritis         Past Surgical History:   Procedure Laterality Date   • HYSTERECTOMY  2008       No Known Allergies    Objective   /64   Temp 97.3 °F (36.3 °C)   Ht 167.6 cm (66\")   Wt 104 kg (229 lb)   BMI 36.96 kg/m²    Physical Exam    Skin exam stable with no erythema, ecchymosis or rash.  No new swelling.  No motor or sensory changes.  Distal pulse intact.    Large Joint Arthrocentesis: L knee  Date/Time: 6/24/2021 8:23 AM  Consent given by: patient  Site marked: site marked  Timeout: Immediately prior to procedure a time out was called to verify the correct patient, procedure, equipment, support staff and site/side marked as required   Supporting Documentation  Indications: pain   Procedure Details  Location: knee - L knee  Preparation: Patient was prepped and draped in the usual sterile fashion  Needle size: 22 G  Medications administered: 25 mg sodium hyaluronate 25 MG/2.5ML  Patient tolerance: patient tolerated the procedure well with no immediate complications          Assessment/Plan     No diagnosis found.    No complications of injection noted.    Discussion of orthopaedic goals and activities and patient and/or guardian expressed appreciation. Call or notify for any adverse effect from injection therapy. Ice, heat, and/or modalities as beneficial. Watch for signs and symptoms of infection.    Recommendations:  Work/Activity Status: May perform usual activities as tolerated. May return to routine exercise and physical work as tolerated. No strenuous activity.  Patient and/or guardian is encouraged to call or return for any issues or concerns.    No follow-ups on file.  Patient agreeable " to call or return sooner for any concerns.

## 2022-07-07 ENCOUNTER — TELEPHONE (OUTPATIENT)
Dept: SURGERY | Facility: CLINIC | Age: 62
End: 2022-07-07

## 2022-07-07 NOTE — TELEPHONE ENCOUNTER
SPOKE WITH JAREK  SHE STATED THAT SHE WILL CALL THE INSURANCE SEE WHAT THEY WILL COVER FOR THE PROCEDURE.

## 2022-07-13 RX ORDER — POLYETHYLENE GLYCOL 3350 17 G/17G
238 POWDER, FOR SOLUTION ORAL ONCE
Qty: 17 PACKET | Refills: 0 | Status: SHIPPED | OUTPATIENT
Start: 2022-07-13 | End: 2022-07-13

## 2022-07-13 RX ORDER — BISACODYL 5 MG
TABLET, DELAYED RELEASE (ENTERIC COATED) ORAL
Qty: 4 TABLET | Refills: 0 | Status: SHIPPED | OUTPATIENT
Start: 2022-07-13

## 2022-07-13 NOTE — TELEPHONE ENCOUNTER
PRESCREENING FOR OPEN ACCESS SCHEDULING    Kellie Parks, 1960  [id number]    07/13/22    If, the patient answers yes to any of the following questions the provider will be informed prior to scheduling open access for approval and documented in the chart.    []  Yes  [x] No    1. Have you ever had a colonoscopy in the past?      When:  [facility]        Where:       Polyps or other:     []  Yes  [x] No    2. Family history of colon cancer?      Relation: father      Age of onset:       Do you currently have any of the following?    []  Yes  [x] No  Rectal bleeding, if so, how long?     []  Yes  [x] No  Abdominal pain, if so, how long?    [x]  Yes  [] No  Constipation, if so, how long?    []  Yes  [x] No  Diarrhea, if so, how long?    []  Yes  [x] No  Weight loss, is so, how much?    [] Yes  [x] No  Small caliber stool, if so, how long?      Have you ever had any of the following conditions?    [] Yes  [x] No  Heart attack?      When?       Last cardiac workup?     Blood thinners?    [] Yes  [x] No   Lung problems, asthma or COPD?  [] Yes  [x] No  Oxygen required?       [] Yes  [x] No  Stroke?     [] Yes  [x] No  Have you ever had a reaction to anesthesia?

## 2023-03-16 ENCOUNTER — TRANSCRIBE ORDERS (OUTPATIENT)
Dept: ADMINISTRATIVE | Facility: HOSPITAL | Age: 63
End: 2023-03-16
Payer: COMMERCIAL

## 2023-03-16 DIAGNOSIS — Z12.31 ENCOUNTER FOR SCREENING MAMMOGRAM FOR MALIGNANT NEOPLASM OF BREAST: Primary | ICD-10-CM

## 2023-06-16 ENCOUNTER — HOSPITAL ENCOUNTER (OUTPATIENT)
Dept: MAMMOGRAPHY | Facility: HOSPITAL | Age: 63
Discharge: HOME | End: 2023-06-16
Admitting: NURSE PRACTITIONER
Payer: MEDICARE

## 2023-06-16 DIAGNOSIS — Z12.31 ENCOUNTER FOR SCREENING MAMMOGRAM FOR MALIGNANT NEOPLASM OF BREAST: ICD-10-CM

## 2023-06-16 PROCEDURE — 77063 BREAST TOMOSYNTHESIS BI: CPT

## 2023-06-16 PROCEDURE — 77067 SCR MAMMO BI INCL CAD: CPT

## 2024-04-10 NOTE — PROGRESS NOTES
Patient: Kellie Parks    YOB: 1960    Date: 04/12/2024    Primary Care Provider: Machelle Cooney APRN    Chief Complaint   Patient presents with    Colonoscopy       SUBJECTIVE:    History of present illness: Patient with a strong family history of colon cancer in both her father and son.  She has no GI complaints.  Last colonoscopy 5 years ago    The following portions of the patient's history were reviewed and updated as appropriate: allergies, current medications, past family history, past medical history, past social history, past surgical history and problem list.    Review of Systems   Constitutional:  Negative for chills, fever and unexpected weight change.   HENT:  Negative for hearing loss, trouble swallowing and voice change.    Eyes:  Negative for visual disturbance.   Respiratory:  Negative for apnea, cough, chest tightness, shortness of breath and wheezing.    Cardiovascular:  Negative for chest pain, palpitations and leg swelling.   Gastrointestinal:  Negative for abdominal distention, abdominal pain, anal bleeding, blood in stool, constipation, diarrhea, nausea, rectal pain and vomiting.   Endocrine: Negative for cold intolerance and heat intolerance.   Genitourinary:  Negative for difficulty urinating, dysuria and flank pain.   Musculoskeletal:  Negative for back pain and gait problem.   Skin:  Negative for color change, rash and wound.   Neurological:  Negative for dizziness, syncope, speech difficulty, weakness, light-headedness, numbness and headaches.   Hematological:  Negative for adenopathy. Does not bruise/bleed easily.   Psychiatric/Behavioral:  Negative for confusion. The patient is not nervous/anxious.        History:  Past Medical History:   Diagnosis Date    Acid reflux     Arthritis     Hyperlipidemia     Hypertension     Hypothyroidism     Osteoarthritis        Past Surgical History:   Procedure Laterality Date    HYSTERECTOMY  2008       Family History   Problem  "Relation Age of Onset    Arthritis Mother     Osteoporosis Mother     Hypertension Mother     Prostate cancer Father 76         with bone mets    Hypertension Father     Colon cancer Father 75    Colon cancer Son 17    Kidney failure Son        Social History     Tobacco Use    Smoking status: Never    Smokeless tobacco: Never   Vaping Use    Vaping status: Never Used   Substance Use Topics    Alcohol use: No    Drug use: No       Allergies:  No Known Allergies    Medications:    Current Outpatient Medications:     fluticasone (FLONASE) 50 MCG/ACT nasal spray, INHALE 1 SPRAY IN EACH NOSTRIL EVERY DAY, Disp: , Rfl:     levothyroxine (SYNTHROID, LEVOTHROID) 150 MCG tablet, Take 1 tablet by mouth Daily., Disp: , Rfl:     loratadine (CLARITIN) 10 MG tablet, Take 1 tablet by mouth Daily., Disp: , Rfl:     losartan (COZAAR) 50 MG tablet, Take 1 tablet by mouth Daily., Disp: , Rfl:     metoprolol succinate XL (TOPROL-XL) 25 MG 24 hr tablet, Take 1 tablet by mouth Daily., Disp: , Rfl:     simvastatin (ZOCOR) 20 MG tablet, Take 1 tablet by mouth Daily., Disp: , Rfl:     OBJECTIVE:    Vital Signs:   Vitals:    04/12/24 0928   BP: 124/68   Pulse: 100   Temp: 97.7 °F (36.5 °C)   SpO2: 95%   Weight: 113 kg (249 lb)   Height: 167.6 cm (65.98\")       Physical Exam:   General Appearance:    Alert, cooperative, in no acute distress   Head:    Normocephalic, without obvious abnormality, atraumatic   Eyes:            Normal.  No scleral icterus.  PERRLA    Lungs:     Clear to auscultation,respirations regular, even and                  unlabored    Heart:    Regular rhythm and normal rate, normal S1 and S2, no            murmur   Abdomen:     Normal bowel sounds, no masses, no organomegaly, soft        non-tender, non-distended, no guarding,    Extremities:   Moves all extremities well, no edema, no cyanosis, no             redness   Skin:   No bleeding, bruising or rash   Neurologic:   Normal without gross deficits.   Psychiatric: No " evidence of depression or anxiety        Results Review:   None    Review of Systems was reviewed and confirmed as accurate as documented by the MA.    ASSESSMENT/PLAN:    1. Family history of colon cancer requiring screening colonoscopy        Recommend a screening colonoscopy due to her strong family history of colon cancer.  The procedure was explained as well as the risks which include but are not limited to bleeding, infection, perforation, abdominal pain etc. The patient understands these risks and the procedure and wishes to proceed.     Electronically signed by Cristofer Shah MD  04/12/24 13:23 EDT

## 2024-04-12 ENCOUNTER — OFFICE VISIT (OUTPATIENT)
Dept: SURGERY | Facility: CLINIC | Age: 64
End: 2024-04-12
Payer: MEDICARE

## 2024-04-12 VITALS
BODY MASS INDEX: 40.02 KG/M2 | HEIGHT: 66 IN | WEIGHT: 249 LBS | HEART RATE: 100 BPM | TEMPERATURE: 97.7 F | SYSTOLIC BLOOD PRESSURE: 124 MMHG | OXYGEN SATURATION: 95 % | DIASTOLIC BLOOD PRESSURE: 68 MMHG

## 2024-04-12 DIAGNOSIS — Z80.0 FAMILY HISTORY OF COLON CANCER REQUIRING SCREENING COLONOSCOPY: Primary | ICD-10-CM

## 2024-04-12 PROCEDURE — 1159F MED LIST DOCD IN RCRD: CPT | Performed by: SURGERY

## 2024-04-12 PROCEDURE — S0260 H&P FOR SURGERY: HCPCS | Performed by: SURGERY

## 2024-04-12 PROCEDURE — 1160F RVW MEDS BY RX/DR IN RCRD: CPT | Performed by: SURGERY

## 2024-04-12 RX ORDER — FLUTICASONE PROPIONATE 50 MCG
SPRAY, SUSPENSION (ML) NASAL
COMMUNITY

## 2024-04-12 RX ORDER — BISACODYL 5 MG/1
TABLET, DELAYED RELEASE ORAL
Qty: 4 TABLET | Refills: 0 | Status: SHIPPED | OUTPATIENT
Start: 2024-04-12

## 2024-04-12 RX ORDER — LOSARTAN POTASSIUM 50 MG/1
50 TABLET ORAL DAILY
COMMUNITY

## 2024-04-12 RX ORDER — POLYETHYLENE GLYCOL 3350 17 G/17G
POWDER, FOR SOLUTION ORAL
Qty: 238 G | Refills: 0 | Status: SHIPPED | OUTPATIENT
Start: 2024-04-12

## 2024-04-18 PROBLEM — Z80.0 FAMILY HISTORY OF COLON CANCER REQUIRING SCREENING COLONOSCOPY: Status: ACTIVE | Noted: 2024-04-12

## 2024-05-17 ENCOUNTER — TELEPHONE (OUTPATIENT)
Dept: SURGERY | Facility: CLINIC | Age: 64
End: 2024-05-17

## 2024-05-17 NOTE — TELEPHONE ENCOUNTER
Patient called saying she needs to reschedule her colonoscopy that is scheduled 05/24/24  with Dr Shah . Patient needs it rescheduled on a Friday

## 2024-06-11 DIAGNOSIS — M25.562 ARTHRALGIA OF LEFT KNEE: Primary | ICD-10-CM

## 2024-06-14 ENCOUNTER — OFFICE VISIT (OUTPATIENT)
Dept: ORTHOPEDIC SURGERY | Facility: CLINIC | Age: 64
End: 2024-06-14
Payer: MEDICARE

## 2024-06-14 VITALS — HEIGHT: 66 IN | TEMPERATURE: 98 F | WEIGHT: 239.8 LBS | BODY MASS INDEX: 38.54 KG/M2

## 2024-06-14 DIAGNOSIS — M17.0 BILATERAL PRIMARY OSTEOARTHRITIS OF KNEE: Primary | ICD-10-CM

## 2024-06-14 RX ORDER — LOSARTAN POTASSIUM AND HYDROCHLOROTHIAZIDE 12.5; 5 MG/1; MG/1
1 TABLET ORAL DAILY
COMMUNITY
Start: 2024-06-04

## 2024-06-14 RX ORDER — HYALURONATE SODIUM 30 MG/2 ML
30 SYRINGE (ML) INTRAARTICULAR WEEKLY
Qty: 12 ML | Refills: 0 | Status: SHIPPED | OUTPATIENT
Start: 2024-06-14 | End: 2024-06-29

## 2024-06-14 RX ORDER — LIDOCAINE HYDROCHLORIDE 20 MG/ML
2 INJECTION, SOLUTION INFILTRATION; PERINEURAL
Status: COMPLETED | OUTPATIENT
Start: 2024-06-14 | End: 2024-06-14

## 2024-06-14 RX ORDER — METHYLPREDNISOLONE ACETATE 40 MG/ML
40 INJECTION, SUSPENSION INTRA-ARTICULAR; INTRALESIONAL; INTRAMUSCULAR; SOFT TISSUE
Status: COMPLETED | OUTPATIENT
Start: 2024-06-14 | End: 2024-06-14

## 2024-06-14 RX ADMIN — LIDOCAINE HYDROCHLORIDE 2 ML: 20 INJECTION, SOLUTION INFILTRATION; PERINEURAL at 10:36

## 2024-06-14 RX ADMIN — METHYLPREDNISOLONE ACETATE 40 MG: 40 INJECTION, SUSPENSION INTRA-ARTICULAR; INTRALESIONAL; INTRAMUSCULAR; SOFT TISSUE at 10:36

## 2024-06-14 NOTE — PROGRESS NOTES
Office Note     Name: Kellie Parks    : 1960     MRN: [id number]     Chief Complaint  Pain of the Left Knee (Seen in office  for left knee OA, had Supartz series that has provided relief up until recently, no new injury) and Pain of the Right Knee    Subjective     History of Present Illness:  Kellie Parks is a 64 y.o. female presenting today for evaluation of chronic bilateral knee pain, worse on the left, ongoing for many years but recently worse in the past few months.  Patient complains of bilateral knee pain mostly in the medial joint lines bilaterally as well as the peripatellar region.  She states that she was told many years ago that she has significant arthritis and chronic degenerative changes especially in her left knee.  She has had treatment in the past including cortisone injections and viscosupplementation with good relief approximately 3 years ago.  Today she states that she would like to restart treatment for her knees.  She denies any paresthesias.  She denies any significant buckling or instability.  Denies any recent or previous injuries to the affected area.    Review of Systems   Musculoskeletal:  Positive for arthralgias (bilateral knee), joint swelling (bilateral knee) and neck pain.        Past Medical History:   Diagnosis Date    Acid reflux     Arthritis     Hyperlipidemia     Hypertension     Hypothyroidism     Osteoarthritis         Past Surgical History:   Procedure Laterality Date    HYSTERECTOMY         Family History   Problem Relation Age of Onset    Arthritis Mother     Osteoporosis Mother     Hypertension Mother     Rheum arthritis Mother     Rheum arthritis Father     Prostate cancer Father 76         with bone mets    Hypertension Father     Colon cancer Father 75    Colon cancer Son 17    Kidney failure Son        Social History     Socioeconomic History    Marital status:    Tobacco Use    Smoking status: Never    Smokeless tobacco: Never   Vaping  "Use    Vaping status: Never Used   Substance and Sexual Activity    Alcohol use: No    Drug use: No    Sexual activity: Not Currently         Current Outpatient Medications:     losartan-hydrochlorothiazide (HYZAAR) 50-12.5 MG per tablet, Take 1 tablet by mouth Daily., Disp: , Rfl:     fluticasone (FLONASE) 50 MCG/ACT nasal spray, INHALE 1 SPRAY IN EACH NOSTRIL EVERY DAY, Disp: , Rfl:     levothyroxine (SYNTHROID, LEVOTHROID) 150 MCG tablet, Take 1 tablet by mouth Daily., Disp: , Rfl:     loratadine (CLARITIN) 10 MG tablet, Take 1 tablet by mouth Daily., Disp: , Rfl:     losartan (COZAAR) 50 MG tablet, Take 1 tablet by mouth Daily., Disp: , Rfl:     metoprolol succinate XL (TOPROL-XL) 25 MG 24 hr tablet, Take 1 tablet by mouth Daily., Disp: , Rfl:     polyethylene glycol (MiraLax) 17 GM/SCOOP powder, Mix 238g powder with 64 oz of clear liquid. Use as directed, Disp: 238 g, Rfl: 0    simvastatin (ZOCOR) 20 MG tablet, Take 1 tablet by mouth Daily., Disp: , Rfl:     No Known Allergies        Objective   Temp 98 °F (36.7 °C)   Ht 167.6 cm (65.98\")   Wt 109 kg (239 lb 12.8 oz)   BMI 38.73 kg/m²            Physical Exam  Musculoskeletal:      Right knee: No effusion.      Instability Tests: Medial Nilson test negative and lateral Nilson test negative.      Left knee: No effusion.      Instability Tests: Medial Nilson test positive. Lateral Nilson test negative.     Right Knee Exam     Muscle Strength   The patient has normal right knee strength.    Tenderness   The patient is experiencing tenderness in the medial joint line and patella.    Range of Motion   The patient has normal right knee ROM.    Tests   Nilson:  Medial - negative Lateral - negative  Varus: negative Valgus: negative  Lachman:  Anterior - negative    Posterior - negative  Drawer:  Anterior - negative    Posterior - negative  Patellar apprehension: negative    Other   Erythema: absent  Sensation: normal  Pulse: present  Swelling: " none  Effusion: no effusion present      Left Knee Exam     Muscle Strength   The patient has normal left knee strength.    Tenderness   The patient is experiencing tenderness in the medial joint line and patella.    Range of Motion   The patient has normal left knee ROM.    Tests   Nilson:  Medial - positive Lateral - negative  Varus: negative Valgus: negative  Lachman:  Anterior - negative    Posterior - negative  Drawer:  Anterior - negative     Posterior - negative  Patellar apprehension: negative    Other   Erythema: absent  Sensation: normal  Pulse: present  Swelling: none  Effusion: no effusion present    Comments:  Positive patellar grind test, crepitus is noted in the knee with flexion and extension.  Medial Nilson positive for pain without click.  No mechanical locking bilaterally.         Extremity DVT signs are negative by clinical screen.     Independent Review of Radiographic Studies:    2 view plain films of both knees were done in office today for the evaluation of pain and joint condition, comparison views available.  Left knee shows moderate degenerative changes throughout the knee most pronounced in the medial patellofemoral joint line as well as osteophytic lipping and small osteophytes.  Left knee also shows an unspecified calcification in the soft tissue, seen on AP and lateral views.  This may represent a calcified cartilaginous lesion secondary to a chronic baker's cyst but other lesion cannot be ruled out, recommend MRI for further evaluation.  The right knee shows mild degenerative changes throughout the knee without significant osteophyte formation.  No acute osseous abnormalities are noted.    Large Joint Arthrocentesis: R knee  Date/Time: 6/14/2024 10:36 AM  Consent given by: patient  Site marked: site marked  Timeout: Immediately prior to procedure a time out was called to verify the correct patient, procedure, equipment, support staff and site/side marked as required   Supporting  Documentation  Indications: pain   Procedure Details  Location: knee - R knee  Preparation: Patient was prepped and draped in the usual sterile fashion  Needle size: 22 G  Medications administered: 2 mL lidocaine 2%; 40 mg methylPREDNISolone acetate 40 MG/ML  Patient tolerance: patient tolerated the procedure well with no immediate complications      Large Joint Arthrocentesis: L knee  Date/Time: 6/14/2024 10:36 AM  Consent given by: patient  Site marked: site marked  Timeout: Immediately prior to procedure a time out was called to verify the correct patient, procedure, equipment, support staff and site/side marked as required   Supporting Documentation  Indications: pain   Procedure Details  Location: knee - L knee  Preparation: Patient was prepped and draped in the usual sterile fashion  Needle size: 22 G  Medications administered: 40 mg methylPREDNISolone acetate 40 MG/ML; 2 mL lidocaine 2%  Patient tolerance: patient tolerated the procedure well with no immediate complications      Assessment and Plan   Diagnoses and all orders for this visit:    1. Bilateral primary osteoarthritis of knee (Primary)       Discussion of orthopedic goals  Orthopedic activities reviewed and patient expressed appreciation  Regular exercise as tolerated  Risk, benefits, and merits of treatment alternatives reviewed with the patient and questions answered  Patient guided on mobility and conditioning exercises  Ice, heat, and/or modalities as beneficial  Elevate leg for residual swelling  Call or notify for any adverse effect from injection therapy  Reduced physical activity as appropriate and avoid offending activity  Counseling on diet, nutrition, fitness exercise, and weight reduction goals    Recommendations/Plan:  Exercise, medications, injections, other patient advice, and return appointment as noted.  Patient is encouraged to call or return for any issues or concerns.    Patient was given cortisone injections into both knees  today for symptomatic relief.  We will also begin approval process for viscosupplementation.  Advised RICE and over-the-counter analgesics as needed and beneficial.  Advised use of a knee sleeve to reduce swelling.  Advise follow-up once viscosupplementation is available.  Patient is agreeable with plan advised her to call or return office for any concerns in the interim.    An MRI of the left knee was ordered for further evaluation of the partially calcified mass in the soft tissue.  This may represent changes from a chronic baker's cyst but other lesion cannot be ruled out.      Return if symptoms worsen or fail to improve.  Patient agreeable to call or return sooner for any concerns.

## 2024-06-18 ENCOUNTER — TELEPHONE (OUTPATIENT)
Dept: ORTHOPEDIC SURGERY | Facility: CLINIC | Age: 64
End: 2024-06-18
Payer: MEDICARE

## 2024-06-18 NOTE — TELEPHONE ENCOUNTER
Provider: DEATON    Caller: JAREK    Relationship to Patient: SELF    Pharmacy:     Phone Number: [phone]    Reason for Call: PT CALLING TO GET AN UPDATE ON THE MRI THAT WAS ORDERED AND WANTING TO KNOW IF WE ARE WAITING ON AUTH OR IF SHE CAN SCHEDULE THAT - ALSO WANTS TO LET NILS KNOW THAT SHE GOT A CALL THAT HER GEL INJS HAVE BEEN SHIPPED

## 2024-06-19 NOTE — TELEPHONE ENCOUNTER
Spoke with patient to inform, per Julian, she can start Orthovisc before MRI, I will call her to schedule once I receive the injections, also that someone from the hub should be calling her soon to schedule the left knee MRI

## 2024-06-20 ENCOUNTER — TELEPHONE (OUTPATIENT)
Dept: ORTHOPEDIC SURGERY | Facility: CLINIC | Age: 64
End: 2024-06-20
Payer: MEDICARE

## 2024-06-20 NOTE — TELEPHONE ENCOUNTER
Provider:  NILS MCKENZIE PA-C    Caller:  DARRYL FROM [facility]    Relationship to Patient: PHARMACY    Pharmacy:  [facility]     Phone Number:  [phone] (CAN SPEAK WITH ANY ONE)    Reason for Call:  CALLING TO SET UP DELIVERY FOR ORTHOVISC.     When was the patient last seen:  6/14/24     Initiate Treatment: Hydroxyzine 25mg QD at bedtime Detail Level: Detailed Continue Regimen: Methotrexate, increase to 6/wkly Plan: Dr. Henry consulted with patient and examined rash. We will treat for Lichen Planus. Increase MTX from 3 weekly to 6 weekly, recheck in 1 month if no improvement will try Soriatane 25mg. No oral lesions present.

## 2024-06-20 NOTE — TELEPHONE ENCOUNTER
Spoke with [facility], Orthovisc will be delivered Tuesday June 25, I will call patient to schedule once received

## 2024-06-27 ENCOUNTER — OFFICE VISIT (OUTPATIENT)
Dept: ORTHOPEDIC SURGERY | Facility: CLINIC | Age: 64
End: 2024-06-27
Payer: MEDICARE

## 2024-06-27 DIAGNOSIS — M17.0 BILATERAL PRIMARY OSTEOARTHRITIS OF KNEE: Primary | ICD-10-CM

## 2024-06-27 NOTE — PROGRESS NOTES
Office Note     Name: Kellie Parks    : 1960     MRN: [id number]     Chief Complaint  Injections of the Left Knee (Orthovisc #1.) and Injections of the Right Knee    Subjective     History of Present Illness:  Kellie Parks is a 64 y.o. female here today for injection therapy.    Review of Systems     Past Medical History:   Diagnosis Date    Acid reflux     Arthritis     Hyperlipidemia     Hypertension     Hypothyroidism     Osteoarthritis          Past Surgical History:   Procedure Laterality Date    HYSTERECTOMY         No Known Allergies      Objective   There were no vitals taken for this visit.   Physical Exam    Skin exam stable with no erythema, ecchymosis or rash.  No new swelling.  No motor or sensory changes.  Distal pulse intact.    Large Joint Arthrocentesis: R knee  Date/Time: 2024 3:11 PM  Consent given by: patient  Site marked: site marked  Timeout: Immediately prior to procedure a time out was called to verify the correct patient, procedure, equipment, support staff and site/side marked as required   Supporting Documentation  Indications: pain   Procedure Details  Location: knee - R knee  Preparation: Patient was prepped and draped in the usual sterile fashion  Needle size: 22 G  Approach: anterolateral  Medications administered: 30 mg Hyaluronan 30 MG/2ML  Patient tolerance: patient tolerated the procedure well with no immediate complications      Large Joint Arthrocentesis: L knee  Date/Time: 2024 3:12 PM  Consent given by: patient  Site marked: site marked  Timeout: Immediately prior to procedure a time out was called to verify the correct patient, procedure, equipment, support staff and site/side marked as required   Supporting Documentation  Indications: pain   Procedure Details  Location: knee - L knee  Preparation: Patient was prepped and draped in the usual sterile fashion  Needle size: 22 G  Approach: anterolateral  Medications administered: 30 mg Hyaluronan 30  MG/2ML  Patient tolerance: patient tolerated the procedure well with no immediate complications        Assessment and Plan      Diagnosis Plan   1. Bilateral primary osteoarthritis of knee            Discussion of orthopaedic goals and activities and patient expressed appreciation.  Regular exercise as tolerated  Ice, heat, and/or modalities as beneficial  Watch for signs and symptoms of infection  Call or notify for any adverse effect from injection therapy    Recommendations:  Usual activities, routine exercise as tolerated, light physical work as tolerated, no strenuous activity.    Return in about 1 week (around 7/4/2024) for Recheck.  Patient agreeable to call or return sooner for any concerns.

## 2024-07-05 ENCOUNTER — OFFICE VISIT (OUTPATIENT)
Dept: ORTHOPEDIC SURGERY | Facility: CLINIC | Age: 64
End: 2024-07-05
Payer: MEDICARE

## 2024-07-05 VITALS — WEIGHT: 239 LBS | BODY MASS INDEX: 38.41 KG/M2 | HEIGHT: 66 IN | TEMPERATURE: 98.2 F

## 2024-07-05 DIAGNOSIS — M17.0 BILATERAL PRIMARY OSTEOARTHRITIS OF KNEE: Primary | ICD-10-CM

## 2024-07-05 NOTE — PROGRESS NOTES
"    Office Note     Name: Kellie Parks    : 1960     MRN: [id number]     Chief Complaint  Injections of the Left Knee (Orthovisc #2.) and Injections of the Right Knee    Subjective     History of Present Illness:  Kellie Parks is a 64 y.o. female here today for injection therapy.    Review of Systems     Past Medical History:   Diagnosis Date    Acid reflux     Arthritis     Hyperlipidemia     Hypertension     Hypothyroidism     Osteoarthritis          Past Surgical History:   Procedure Laterality Date    HYSTERECTOMY         No Known Allergies      Objective   Temp 98.2 °F (36.8 °C)   Ht 167.6 cm (65.98\")   Wt 108 kg (239 lb)   BMI 38.59 kg/m²    Physical Exam    Skin exam stable with no erythema, ecchymosis or rash.  No new swelling.  No motor or sensory changes.  Distal pulse intact.    Large Joint Arthrocentesis: R knee  Date/Time: 2024 8:54 AM  Consent given by: patient  Site marked: site marked  Timeout: Immediately prior to procedure a time out was called to verify the correct patient, procedure, equipment, support staff and site/side marked as required   Supporting Documentation  Indications: pain   Procedure Details  Location: knee - R knee  Preparation: Patient was prepped and draped in the usual sterile fashion  Needle size: 22 G  Approach: anterolateral  Medications administered: 30 mg Hyaluronan 30 MG/2ML  Patient tolerance: patient tolerated the procedure well with no immediate complications      Large Joint Arthrocentesis: L knee  Date/Time: 2024 8:54 AM  Consent given by: patient  Site marked: site marked  Timeout: Immediately prior to procedure a time out was called to verify the correct patient, procedure, equipment, support staff and site/side marked as required   Supporting Documentation  Indications: pain   Procedure Details  Location: knee - L knee  Preparation: Patient was prepped and draped in the usual sterile fashion  Needle size: 22 G  Approach: " anterolateral  Medications administered: 30 mg Hyaluronan 30 MG/2ML  Patient tolerance: patient tolerated the procedure well with no immediate complications        Assessment and Plan      Diagnosis Plan   1. Bilateral primary osteoarthritis of knee            Discussion of orthopaedic goals and activities and patient expressed appreciation.  Regular exercise as tolerated  Ice, heat, and/or modalities as beneficial  Watch for signs and symptoms of infection  Call or notify for any adverse effect from injection therapy    Recommendations:  Usual activities, routine exercise as tolerated, light physical work as tolerated, no strenuous activity.    Return in about 1 week (around 7/12/2024) for Recheck.  Patient agreeable to call or return sooner for any concerns.

## 2024-07-10 ENCOUNTER — OFFICE VISIT (OUTPATIENT)
Dept: ORTHOPEDIC SURGERY | Facility: CLINIC | Age: 64
End: 2024-07-10
Payer: MEDICARE

## 2024-07-10 VITALS
DIASTOLIC BLOOD PRESSURE: 64 MMHG | WEIGHT: 239 LBS | TEMPERATURE: 97.7 F | HEIGHT: 66 IN | SYSTOLIC BLOOD PRESSURE: 122 MMHG | BODY MASS INDEX: 38.41 KG/M2

## 2024-07-10 DIAGNOSIS — M17.0 BILATERAL PRIMARY OSTEOARTHRITIS OF KNEE: Primary | ICD-10-CM

## 2024-07-10 PROCEDURE — 1160F RVW MEDS BY RX/DR IN RCRD: CPT | Performed by: STUDENT IN AN ORGANIZED HEALTH CARE EDUCATION/TRAINING PROGRAM

## 2024-07-10 PROCEDURE — 20610 DRAIN/INJ JOINT/BURSA W/O US: CPT | Performed by: STUDENT IN AN ORGANIZED HEALTH CARE EDUCATION/TRAINING PROGRAM

## 2024-07-10 PROCEDURE — 1159F MED LIST DOCD IN RCRD: CPT | Performed by: STUDENT IN AN ORGANIZED HEALTH CARE EDUCATION/TRAINING PROGRAM

## 2024-07-10 NOTE — PROGRESS NOTES
"    Office Note     Name: Kellie Parks    : 1960     MRN: [id number]     Chief Complaint  Injections of the Right Knee (Orthovisc #3) and Injections of the Left Knee (Orthovisc #3)    Subjective     History of Present Illness:  Kellie Parks is a 64 y.o. female here today for injection therapy.    Review of Systems   Constitutional:  Negative for fever.   HENT:  Negative for dental problem and voice change.    Eyes:  Negative for visual disturbance.   Respiratory:  Negative for shortness of breath.    Cardiovascular:  Negative for chest pain.   Gastrointestinal:  Negative for abdominal pain.   Genitourinary:  Negative for dysuria.   Musculoskeletal:  Positive for arthralgias. Negative for gait problem and joint swelling.   Skin:  Negative for rash.   Neurological:  Negative for speech difficulty.   Hematological:  Does not bruise/bleed easily.   Psychiatric/Behavioral:  Negative for confusion.         Past Medical History:   Diagnosis Date    Acid reflux     Arthritis     Chronic kidney disease (CKD)     stage II    Elevated cholesterol     Hyperlipidemia     Hypertension     Hypothyroidism     Osteoarthritis     Snores     Wears contact lenses          Past Surgical History:   Procedure Laterality Date    COLONOSCOPY      HYSTERECTOMY         No Known Allergies      Objective   /64   Temp 97.7 °F (36.5 °C)   Ht 167.6 cm (65.98\")   Wt 108 kg (239 lb)   BMI 38.60 kg/m²    Physical Exam    Skin exam stable with no erythema, ecchymosis or rash.  No new swelling.  No motor or sensory changes.  Distal pulse intact.    Large Joint Arthrocentesis: R knee  Date/Time: 7/10/2024 3:08 PM  Consent given by: patient  Site marked: site marked  Timeout: Immediately prior to procedure a time out was called to verify the correct patient, procedure, equipment, support staff and site/side marked as required   Supporting Documentation  Indications: pain   Procedure Details  Location: knee - R knee  Preparation: " Patient was prepped and draped in the usual sterile fashion  Needle gauge: 21 G.  Approach: anterolateral  Medications administered: 30 mg Hyaluronan 30 MG/2ML  Patient tolerance: patient tolerated the procedure well with no immediate complications      Large Joint Arthrocentesis: L knee  Date/Time: 7/10/2024 3:09 PM  Consent given by: patient  Site marked: site marked  Timeout: Immediately prior to procedure a time out was called to verify the correct patient, procedure, equipment, support staff and site/side marked as required   Supporting Documentation  Indications: pain   Procedure Details  Location: knee - L knee  Preparation: Patient was prepped and draped in the usual sterile fashion  Needle gauge: 21 g.  Approach: anterolateral  Medications administered: 30 mg Hyaluronan 30 MG/2ML  Patient tolerance: patient tolerated the procedure well with no immediate complications          Assessment and Plan      Diagnosis Plan   1. Bilateral primary osteoarthritis of knee            Discussion of orthopaedic goals and activities and patient expressed appreciation.  Regular exercise as tolerated  Ice, heat, and/or modalities as beneficial  Watch for signs and symptoms of infection  Call or notify for any adverse effect from injection therapy    Recommendations:  Usual activities, routine exercise as tolerated, light physical work as tolerated, no strenuous activity.    Return if symptoms worsen or fail to improve.  Patient agreeable to call or return sooner for any concerns.

## 2024-07-10 NOTE — PRE-PROCEDURE INSTRUCTIONS
PAT phone history completed with patient for upcoming procedure on 7/12/24 with Dr. Shah.    PAT PASS reviewed with patient and they verbalize understanding of the following:     Do not eat or drink anything after midnight the night before procedure unless otherwise instructed by physician/surgeon's office, this includes no gum, candy, mints, tobacco products or e-cigarettes.  Do not shave the area to be operated on at least 48 hours prior to procedure.  Do not wear makeup, lotion, hair products, or nail polish.  Do not wear any jewelry and remove all piercings.  Do not wear any adhesive if you wear dentures.  Do not wear contacts; bring in glasses if needed.  Only take medications on the morning of procedure as instructed by PAT nurse per anesthesia guidelines or as instructed by physician's office.  If you are on any blood thinners reach out to the physician/surgeon's office for instructions on when/if they will need to be stopped prior to procedure.  Bring in picture ID and insurance card, advanced directive copies if applicable, CPAP/BIPAP/Inhalers if indicated morning of procedure, leave any other valuables at home.  Ensure you have arranged for someone to drive you home the day of your procedure and someone to care for you at home afterwards. It is recommended that you do not drive, drink alcohol, or make any major legal decisions for at least 24 hours after your procedure is complete.    Instructions given on hospital entrance and registration location.

## 2024-07-12 ENCOUNTER — ANESTHESIA EVENT (OUTPATIENT)
Dept: GASTROENTEROLOGY | Facility: HOSPITAL | Age: 64
End: 2024-07-12
Payer: MEDICARE

## 2024-07-12 ENCOUNTER — ANESTHESIA (OUTPATIENT)
Dept: GASTROENTEROLOGY | Facility: HOSPITAL | Age: 64
End: 2024-07-12
Payer: MEDICARE

## 2024-07-12 ENCOUNTER — HOSPITAL ENCOUNTER (OUTPATIENT)
Facility: HOSPITAL | Age: 64
Setting detail: HOSPITAL OUTPATIENT SURGERY
Discharge: HOME | End: 2024-07-12
Attending: SURGERY | Admitting: SURGERY
Payer: MEDICARE

## 2024-07-12 VITALS
DIASTOLIC BLOOD PRESSURE: 82 MMHG | RESPIRATION RATE: 18 BRPM | HEART RATE: 78 BPM | OXYGEN SATURATION: 96 % | TEMPERATURE: 97.5 F | SYSTOLIC BLOOD PRESSURE: 107 MMHG

## 2024-07-12 DIAGNOSIS — Z80.0 FAMILY HISTORY OF COLON CANCER REQUIRING SCREENING COLONOSCOPY: ICD-10-CM

## 2024-07-12 PROCEDURE — 25010000002 PROPOFOL 10 MG/ML EMULSION: Performed by: NURSE ANESTHETIST, CERTIFIED REGISTERED

## 2024-07-12 PROCEDURE — S0260 H&P FOR SURGERY: HCPCS | Performed by: SURGERY

## 2024-07-12 PROCEDURE — 45385 COLONOSCOPY W/LESION REMOVAL: CPT | Performed by: SURGERY

## 2024-07-12 PROCEDURE — 25810000003 LACTATED RINGERS PER 1000 ML: Performed by: SURGERY

## 2024-07-12 PROCEDURE — 88305 TISSUE EXAM BY PATHOLOGIST: CPT

## 2024-07-12 RX ORDER — ONDANSETRON 2 MG/ML
4 INJECTION INTRAMUSCULAR; INTRAVENOUS ONCE AS NEEDED
Status: DISCONTINUED | OUTPATIENT
Start: 2024-07-12 | End: 2024-07-12 | Stop reason: HOSPADM

## 2024-07-12 RX ORDER — SODIUM CHLORIDE, SODIUM LACTATE, POTASSIUM CHLORIDE, CALCIUM CHLORIDE 600; 310; 30; 20 MG/100ML; MG/100ML; MG/100ML; MG/100ML
1000 INJECTION, SOLUTION INTRAVENOUS CONTINUOUS
Status: DISCONTINUED | OUTPATIENT
Start: 2024-07-12 | End: 2024-07-12 | Stop reason: HOSPADM

## 2024-07-12 RX ORDER — SODIUM CHLORIDE 0.9 % (FLUSH) 0.9 %
10 SYRINGE (ML) INJECTION AS NEEDED
Status: DISCONTINUED | OUTPATIENT
Start: 2024-07-12 | End: 2024-07-12 | Stop reason: HOSPADM

## 2024-07-12 RX ORDER — SIMETHICONE 40MG/0.6ML
SUSPENSION, DROPS(FINAL DOSAGE FORM)(ML) ORAL AS NEEDED
Status: DISCONTINUED | OUTPATIENT
Start: 2024-07-12 | End: 2024-07-12 | Stop reason: HOSPADM

## 2024-07-12 RX ORDER — PROPOFOL 10 MG/ML
VIAL (ML) INTRAVENOUS CONTINUOUS PRN
Status: DISCONTINUED | OUTPATIENT
Start: 2024-07-12 | End: 2024-07-12 | Stop reason: SURG

## 2024-07-12 RX ADMIN — PROPOFOL 200 MCG/KG/MIN: 10 INJECTION, EMULSION INTRAVENOUS at 11:09

## 2024-07-12 RX ADMIN — LIDOCAINE HYDROCHLORIDE 60 MG: 20 INJECTION, SOLUTION INTRAVENOUS at 11:09

## 2024-07-12 RX ADMIN — SODIUM CHLORIDE, POTASSIUM CHLORIDE, SODIUM LACTATE AND CALCIUM CHLORIDE 1000 ML: 600; 310; 30; 20 INJECTION, SOLUTION INTRAVENOUS at 10:53

## 2024-07-12 NOTE — ANESTHESIA POSTPROCEDURE EVALUATION
Patient: Kellie Parks    Procedure Summary       Date: 07/12/24 Room / Location: [facility] ENDOSCOPY 2 / [facility] ENDOSCOPY    Anesthesia Start: 1103 Anesthesia Stop: 1134    Procedure: COLONOSCOPY with polypectomy (Anus) Diagnosis:       Family history of colon cancer requiring screening colonoscopy      (Family history of colon cancer requiring screening colonoscopy [Z80.0])    Surgeons: Cristofer Shah MD Provider: Abner Frank CRNA    Anesthesia Type: MAC ASA Status: 2            Anesthesia Type: MAC    Vitals  No vitals data found for the desired time range.          Post Anesthesia Care and Evaluation    Patient location during evaluation: PHASE II  Patient participation: complete - patient participated  Level of consciousness: awake and alert  Pain score: 0  Pain management: satisfactory to patient    Airway patency: patent  Anesthetic complications: No anesthetic complications  PONV Status: none  Cardiovascular status: acceptable and stable  Respiratory status: acceptable and spontaneous ventilation  Hydration status: acceptable    Comments: Vitals signs as noted in nursing documentation as per protocol.

## 2024-07-12 NOTE — ANESTHESIA PREPROCEDURE EVALUATION
Anesthesia Evaluation     Patient summary reviewed and Nursing notes reviewed   NPO Solid Status: > 8 hours  NPO Liquid Status: > 8 hours           Airway   Mallampati: II  TM distance: >3 FB  Neck ROM: full  Possible difficult intubation  Dental - normal exam     Pulmonary - normal exam   (+) ,sleep apnea  Cardiovascular - normal exam    (+) hypertension well controlled less than 2 medications, hyperlipidemia      Neuro/Psych  GI/Hepatic/Renal/Endo    (+) GERD well controlled, renal disease- CRI, thyroid problem hypothyroidism    Musculoskeletal     Abdominal  - normal exam   Substance History      OB/GYN          Other   arthritis,                 Anesthesia Plan    ASA 2     MAC     intravenous induction     Anesthetic plan, risks, benefits, and alternatives have been provided, discussed and informed consent has been obtained with: patient.  Pre-procedure education provided  Plan discussed with CRNA.    CODE STATUS:

## 2024-07-12 NOTE — H&P
[facility]   HISTORY AND PHYSICAL      Name:  Kellie Parks   Age:  64 y.o.  Sex:  female  :  1960  MRN:  [id number]   Visit Number:  [id number]  Admission Date:  2024  Date Of Service:  24  Primary Care Physician:  Machelle Cooney APRN    Chief Complaint:     I need a colonoscopy    History Of Presenting Illness:      Here for her screening colonoscopy.  No new complaints.  No GI complaints    Review Of Systems:     General ROS: Patient denies any fevers, chills or loss of consciousness.  No complaints of generalized weakness  Psychological ROS: Denies any hallucinations and delusions.  Respiratory ROS: Denies cough or shortness of breath.   Cardiovascular ROS: Denies chest pain or palpitations. No history of exertional chest pain.   Gastrointestinal ROS: Denies nausea and vomiting. Denies any abdominal pain. No diarrhea.   Genito-Urinary ROS: Denies dysuria or hematuria.  Neurological ROS: Denies any focal weakness. No loss of consciousness. Denies any numbness.   Dermatological ROS: Denies any redness or pruritis.     Past Medical History:    Past Medical History:   Diagnosis Date    Acid reflux     Arthritis     Chronic kidney disease (CKD)     stage II    Elevated cholesterol     Hyperlipidemia     Hypertension     Hypothyroidism     Osteoarthritis     Snores     Wears contact lenses        Past Surgical history:    Past Surgical History:   Procedure Laterality Date    COLONOSCOPY      HYSTERECTOMY         Social History:    Social History     Socioeconomic History    Marital status:    Tobacco Use    Smoking status: Never    Smokeless tobacco: Never   Vaping Use    Vaping status: Never Used   Substance and Sexual Activity    Alcohol use: No    Drug use: No    Sexual activity: Not Currently       Family History:    Family History   Problem Relation Age of Onset    Arthritis Mother     Osteoporosis Mother     Hypertension Mother     Rheum arthritis  Mother     Rheum arthritis Father     Prostate cancer Father 76         with bone mets    Hypertension Father     Colon cancer Father 75    Colon cancer Son 17    Kidney failure Son        Allergies:      Patient has no known allergies.    Home Medications:    Prior to Admission Medications       Prescriptions Last Dose Informant Patient Reported? Taking?    fluticasone (FLONASE) 50 MCG/ACT nasal spray 7/11/2024  Yes Yes    As Needed.    levothyroxine (SYNTHROID, LEVOTHROID) 150 MCG tablet 7/11/2024  Yes Yes    Take 1 tablet by mouth Daily.    loratadine (CLARITIN) 10 MG tablet 7/11/2024  Yes Yes    Take 1 tablet by mouth As Needed.    losartan-hydrochlorothiazide (HYZAAR) 50-12.5 MG per tablet 7/11/2024  Yes Yes    Take 1 tablet by mouth Daily.    metoprolol succinate XL (TOPROL-XL) 25 MG 24 hr tablet 7/11/2024  Yes Yes    Take 1 tablet by mouth Daily.    polyethylene glycol (MiraLax) 17 GM/SCOOP powder 7/11/2024  No Yes    Mix 238g powder with 64 oz of clear liquid. Use as directed    simvastatin (ZOCOR) 20 MG tablet 7/11/2024  Yes Yes    Take 1 tablet by mouth Daily.               ED Medications:    Medications   lactated ringers infusion 1,000 mL ( Intravenous Currently Infusing 7/12/24 1103)       Vital Signs:    Temp:  [97.2 °F (36.2 °C)] 97.2 °F (36.2 °C)  Heart Rate:  [100] 100  Resp:  [18] 18  BP: (108)/(78) 108/78    There were no vitals filed for this visit.    There is no height or weight on file to calculate BMI.    Physical Exam:      General Appearance:  Alert and cooperative, not in any acute distress.   Head:  Atraumatic and normocephalic, without obvious abnormality.   Eyes:          PERRLA, conjunctivae and sclerae normal, no Icterus. No pallor. Extra-occular movements are within normal limits.   Ears:  Ears appear intact with no abnormalities noted.   Respiratory/Lungs:   Breath sounds heard bilaterally equally.  No crackles or wheezing. No Pleural rub or bronchial breathing. Normal respiratory  effort.    Cardiovascular/Heart:  Normal S1 and S2,    GI/Abdomen:   No masses, no hepatosplenomegaly. Soft, non-tender, non-distended, no hernia                 Musculoskeletal/ Extremities:   Moves all extremities well   Skin: No bleeding, bruising or rash, no induration   Psychiatric : Alert and oriented ×3.  No depression or anxiety    Neurologic: Cranial nerves 2 - 12 grossly intact, sensation intact, Motor power is normal and equal bilaterally.       EKG:          Labs:    Lab Results (last 24 hours)       ** No results found for the last 24 hours. **            Radiology:    Imaging Results (Last 72 Hours)       ** No results found for the last 72 hours. **            Assessment:    Screening colonoscopy with strong family history of colon cancer in her son with family history of colon cancer last colonoscopy more than 5 years ago    Plan:     I recommend a screening colonoscopy to the patient.  The patient understands the procedure and the reason for the procedure.  The patient also understands the risks which include but are not limited to bleeding and perforation and they understand the ramifications of these potential complications including operative intervention and wish to proceed.    Cristofer Shah MD  07/12/24  11:09 EDT

## 2024-07-15 LAB — REF LAB TEST METHOD: NORMAL

## 2024-08-02 ENCOUNTER — HOSPITAL ENCOUNTER (OUTPATIENT)
Dept: MRI IMAGING | Facility: HOSPITAL | Age: 64
Discharge: HOME | End: 2024-08-02
Payer: MEDICARE

## 2024-08-02 DIAGNOSIS — M17.0 BILATERAL PRIMARY OSTEOARTHRITIS OF KNEE: ICD-10-CM

## 2024-08-02 PROCEDURE — 73721 MRI JNT OF LWR EXTRE W/O DYE: CPT

## 2025-01-16 DIAGNOSIS — M25.512 PAIN OF BOTH SHOULDER JOINTS: Primary | ICD-10-CM

## 2025-01-16 DIAGNOSIS — M25.511 PAIN OF BOTH SHOULDER JOINTS: Primary | ICD-10-CM

## 2025-01-17 ENCOUNTER — OFFICE VISIT (OUTPATIENT)
Dept: ORTHOPEDIC SURGERY | Facility: CLINIC | Age: 65
End: 2025-01-17
Payer: MEDICARE

## 2025-01-17 VITALS
BODY MASS INDEX: 38.41 KG/M2 | WEIGHT: 239 LBS | HEIGHT: 66 IN | DIASTOLIC BLOOD PRESSURE: 80 MMHG | SYSTOLIC BLOOD PRESSURE: 126 MMHG

## 2025-01-17 DIAGNOSIS — G89.29 CHRONIC PAIN OF BOTH SHOULDERS: Chronic | ICD-10-CM

## 2025-01-17 DIAGNOSIS — M25.512 CHRONIC PAIN OF BOTH SHOULDERS: Chronic | ICD-10-CM

## 2025-01-17 DIAGNOSIS — M25.819 IMPINGEMENT OF SHOULDER: ICD-10-CM

## 2025-01-17 DIAGNOSIS — M54.2 CHRONIC NECK PAIN: Primary | Chronic | ICD-10-CM

## 2025-01-17 DIAGNOSIS — M54.12 CERVICAL RADICULOPATHY: ICD-10-CM

## 2025-01-17 DIAGNOSIS — M25.511 CHRONIC PAIN OF BOTH SHOULDERS: Chronic | ICD-10-CM

## 2025-01-17 DIAGNOSIS — M54.2 NECK PAIN: ICD-10-CM

## 2025-01-17 DIAGNOSIS — G89.29 CHRONIC NECK PAIN: Primary | Chronic | ICD-10-CM

## 2025-01-17 RX ORDER — METHYLPREDNISOLONE ACETATE 40 MG/ML
40 INJECTION, SUSPENSION INTRA-ARTICULAR; INTRALESIONAL; INTRAMUSCULAR; SOFT TISSUE
Status: COMPLETED | OUTPATIENT
Start: 2025-01-17 | End: 2025-01-17

## 2025-01-17 RX ORDER — LIDOCAINE HYDROCHLORIDE 20 MG/ML
2 INJECTION, SOLUTION INFILTRATION; PERINEURAL
Status: COMPLETED | OUTPATIENT
Start: 2025-01-17 | End: 2025-01-17

## 2025-01-17 RX ADMIN — METHYLPREDNISOLONE ACETATE 40 MG: 40 INJECTION, SUSPENSION INTRA-ARTICULAR; INTRALESIONAL; INTRAMUSCULAR; SOFT TISSUE at 16:24

## 2025-01-17 RX ADMIN — LIDOCAINE HYDROCHLORIDE 2 ML: 20 INJECTION, SOLUTION INFILTRATION; PERINEURAL at 16:24

## 2025-01-17 NOTE — PROGRESS NOTES
Office Note     Name: Kellie Parks    : 1960     MRN: [id number]     Chief Complaint  Pain of the Right Shoulder (States she has been having pain for about two months, no known injury. She has numbness and tingling in her hands and arms.) and Pain of the Left Shoulder    Subjective     History of Present Illness:  Kellie Parks is a 64 y.o. female presenting today for bilateral shoulder pain ongoing for 2 months with no known injury.  The pain is felt in the anterolateral and posterior shoulders bilaterally, right worse than the left.  The pain will radiate from the shoulder down the arm.  She also has N/T that radiates from the shoulder into the arms. Exacerbated by overhead activities, lifting/pushing/pulling.  She denies any previous injury/sx to the affected areas.  She does have chronic neck pain and was told previously that she has neck arthritis.  She has been self treating with over-the-counter analgesics with minimal relief.    Review of Systems     Past Medical History:   Diagnosis Date    Acid reflux     Arthritis     Chronic kidney disease (CKD)     stage II    Elevated cholesterol     Hyperlipidemia     Hypertension     Hypothyroidism     Osteoarthritis     Snores     Wears contact lenses         Past Surgical History:   Procedure Laterality Date    COLONOSCOPY      COLONOSCOPY N/A 2024    Procedure: COLONOSCOPY with polypectomy;  Surgeon: Cristofer Shah MD;  Location: [facility] ENDOSCOPY;  Service: Gastroenterology;  Laterality: N/A;    HYSTERECTOMY         Family History   Problem Relation Age of Onset    Arthritis Mother     Osteoporosis Mother     Hypertension Mother     Rheum arthritis Mother     Rheum arthritis Father     Prostate cancer Father 76         with bone mets    Hypertension Father     Colon cancer Father 75    Colon cancer Son 17    Kidney failure Son        Social History     Socioeconomic History    Marital status:    Tobacco Use    Smoking status:  "Never    Smokeless tobacco: Never   Vaping Use    Vaping status: Never Used   Substance and Sexual Activity    Alcohol use: No    Drug use: No    Sexual activity: Not Currently         Current Outpatient Medications:     fluticasone (FLONASE) 50 MCG/ACT nasal spray, As Needed., Disp: , Rfl:     levothyroxine (SYNTHROID, LEVOTHROID) 150 MCG tablet, Take 1 tablet by mouth Daily., Disp: , Rfl:     loratadine (CLARITIN) 10 MG tablet, Take 1 tablet by mouth As Needed., Disp: , Rfl:     losartan-hydrochlorothiazide (HYZAAR) 50-12.5 MG per tablet, Take 1 tablet by mouth Daily., Disp: , Rfl:     metoprolol succinate XL (TOPROL-XL) 25 MG 24 hr tablet, Take 1 tablet by mouth Daily., Disp: , Rfl:     simvastatin (ZOCOR) 20 MG tablet, Take 1 tablet by mouth Daily., Disp: , Rfl:     No Known Allergies        Objective   /80   Ht 167.6 cm (65.98\")   Wt 108 kg (239 lb)   BMI 38.59 kg/m²            Physical Exam  Right Shoulder Exam     Tenderness   The patient is experiencing tenderness in the acromion and biceps tendon.    Range of Motion   Active abduction:  100   Extension:  30   External rotation:  60   Forward flexion:  80   Internal rotation 0 degrees:  Sacrum     Muscle Strength   Abduction: 4/5   Internal rotation: 4/5   External rotation: 4/5   Supraspinatus: 4/5   Subscapularis: 4/5   Biceps: 4/5     Tests   Amezcua test: positive  Cross arm: negative  Impingement: positive  Drop arm: negative    Other   Erythema: absent  Sensation: normal  Pulse: present      Left Shoulder Exam     Tenderness   The patient is experiencing tenderness in the acromion and biceps tendon.    Range of Motion   Active abduction:  110   Extension:  50   External rotation:  70   Forward flexion:  120   Internal rotation 0 degrees:  T12     Muscle Strength   Abduction: 4/5   Internal rotation: 5/5   External rotation: 4/5   Supraspinatus: 4/5   Subscapularis: 5/5   Biceps: 5/5     Tests   Amezcua test: positive  Cross arm: " negative  Impingement: positive  Drop arm: negative    Other   Erythema: absent  Sensation: normal  Pulse: present            Extremity DVT signs are negative by clinical screen.     Independent Review of Radiographic Studies:    3 view plain films of both shoulders were done in office today for evaluation of pain, no comparison views available.  No acute osseous abnormalities are seen.  There are degenerative changes seen in the bilateral AC joints including joint space narrowing with mild osteophyte formation, right worse than left.  There are also degenerative changes in the glenohumeral joints bilaterally including mild joint space narrowing.  On the left there is a moderately sized osteophyte on the inferior aspect of the humeral head.  On the right there appears to be a small osteophyte on the inferior aspect of the glenoid rim.  There is also increased subacromial space bilaterally, suggestive of bursitis.    2 view plain films of the C-spine were done in office today for the evaluation of pain and paresthesia, no comparison views available.  No acute osseous abnormalities are seen.  There are moderate degenerative changes seen throughout the neck including facet joint arthrosis and anterior endplate osteophyte nation most pronounced at C5-C6 and C6-C7.  C7-T1 is not well-visualized today.  There is reduced disc space at C5-C6 and C6-C7.  There is also loss of the normal lordosis indicating muscle spasm.    Large Joint Arthrocentesis: R subacromial bursa  Date/Time: 1/17/2025 4:24 PM  Consent given by: patient  Site marked: site marked  Timeout: Immediately prior to procedure a time out was called to verify the correct patient, procedure, equipment, support staff and site/side marked as required   Supporting Documentation  Indications: pain   Procedure Details  Location: shoulder - R subacromial bursa  Preparation: Patient was prepped and draped in the usual sterile fashion  Needle size: 22 G  Medications  administered: 40 mg methylPREDNISolone acetate 40 MG/ML; 2 mL lidocaine 2%  Patient tolerance: patient tolerated the procedure well with no immediate complications      Large Joint Arthrocentesis: L subacromial bursa  Date/Time: 1/17/2025 4:24 PM  Consent given by: patient  Site marked: site marked  Timeout: Immediately prior to procedure a time out was called to verify the correct patient, procedure, equipment, support staff and site/side marked as required   Supporting Documentation  Indications: pain   Procedure Details  Location: shoulder - L subacromial bursa  Preparation: Patient was prepped and draped in the usual sterile fashion  Needle size: 22 G  Medications administered: 40 mg methylPREDNISolone acetate 40 MG/ML; 2 mL lidocaine 2%  Patient tolerance: patient tolerated the procedure well with no immediate complications        Assessment and Plan   Diagnoses and all orders for this visit:    1. Chronic neck pain (Primary)  -     XR Spine Cervical 2 or 3 View  -     Ambulatory Referral to Physical Therapy for Evaluation & Treatment    2. Neck pain    3. Cervical radiculopathy  -     XR Spine Cervical 2 or 3 View  -     Ambulatory Referral to Physical Therapy for Evaluation & Treatment    4. Chronic pain of both shoulders  -     Ambulatory Referral to Physical Therapy for Evaluation & Treatment    5. Impingement of shoulder, bilateral  -     Ambulatory Referral to Physical Therapy for Evaluation & Treatment    Other orders  -     Large Joint Arthrocentesis: R subacromial bursa  -     Large Joint Arthrocentesis: L subacromial bursa       Discussion of orthopedic goals  Orthopedic activities reviewed and patient expressed appreciation  Regular exercise as tolerated  Risk, benefits, and merits of treatment alternatives reviewed with the patient and questions answered  Patient guided on mobility and conditioning exercises  Ice, heat, and/or modalities as beneficial  Call or notify for any adverse effect from  injection therapy  Physical therapy referral given  Reduced physical activity as appropriate and avoid offending activity    Recommendations/Plan:  Exercise, medications, injections, other patient advice, and return appointment as noted.  Referral: Physical and Occupational Therapy referral.  Patient is encouraged to call or return for any issues or concerns.    At this time patient is displaying signs and symptoms of chronic pain in both shoulders most likely from impingement component and bursitis, given the increased subacromial space.  She is also displaying signs and symptoms of cervical radiculopathy including pain referred to the posterior shoulder and numbness and tingling radiating down both arms.  She also has a history of chronic neck pain.  She was given a cortisone injection into bilateral subacromial space for symptomatic relief.  She was given a course of physical therapy for her neck and shoulders.  I advised follow-up with me in approximately 2 months for reevaluation.  If poor improvement consider MRI for evaluation of the C-spine and bilateral shoulders as well as bilateral upper extremity EMG.    Return in about 2 months (around 3/17/2025) for Recheck.  Patient agreeable to call or return sooner for any concerns.

## 2025-02-14 ENCOUNTER — TREATMENT (OUTPATIENT)
Dept: PHYSICAL THERAPY | Facility: CLINIC | Age: 65
End: 2025-02-14
Payer: MEDICARE

## 2025-02-14 DIAGNOSIS — M54.2 CHRONIC NECK PAIN: Primary | ICD-10-CM

## 2025-02-14 DIAGNOSIS — G89.29 CHRONIC NECK PAIN: Primary | ICD-10-CM

## 2025-02-14 NOTE — PROGRESS NOTES
Physical Therapy Initial Evaluation and Plan of Care   [address] [place] [address]      Patient: Kellie Parks   : 1960  Diagnosis/ICD-10 Code:  Chronic neck pain [M54.2, G89.29]  Referring practitioner: Julian Churchill PA-C    Subjective Evaluation    History of Present Illness  Mechanism of injury: Pt states that she has had pain in her neck and bilateral shoulders for several months. 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 Pt states she had cortisone shots in bilateral shoulders a few weeks ago and that has helped with the shoulder pain a lot. Pt reports that she has the burning feeling down into her shoulders when she is laying flat. Pt reports that the pain sometimes affects her sleep.       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     Right Tenderness of the cervical paraspinals, lower trapezius, middle trapezius, rhomboids and upper trapezius.     Additional Palpation Details  R > L    Active Range of Motion   Cervical/Thoracic Spine   Cervical    Flexion: 22 degrees   Extension: 38 degrees   Left lateral flexion: 10 degrees   Right lateral flexion: 18 degrees   Left rotation: 46 degrees with pain  Right rotation: 45 degrees with pain    Tests   Cervical     Left   Negative Spurling's sign.     Right   Negative Spurling's sign.      General Comments      Cervical/Thoracic Comments  Tender to palpation at CTJ through T7/8.           Assessment & Plan       Assessment  Impairments: abnormal muscle firing, abnormal muscle tone, abnormal or restricted ROM, activity intolerance, impaired physical strength, lacks appropriate home exercise program and pain with function   Functional limitations: carrying objects, lifting, sleeping, pulling, pushing, uncomfortable because of pain and unable to perform repetitive tasks   Assessment details: Pt is a 65 y/o female presenting with chronic neck pain. Signs and symptoms are consistent with neck pain with mobility deficits leading to pain, decreased ROM, decreased strength, and inability to perform all functional daily tasks. No neuro involvement noted. Skilled physical therapy interventions are indicated to address the above.     Prognosis: good    Goals  Plan Goals: Short Term Goals: 2 weeks  1. Pt to be independent with HEP.  2. Pt to demonstrate cervical AROM 50-75% of WNL to allow for improved ability to perform ADLs.  3. Pt to demonstrate ability to sleep through the night with 2 or less occurrences of waking up due to shoulder or neck pain.    Long Term Goals: 6 weeks  1. Pt to demonstrate cervical AROM  of WNL to show improved function.   2. Pt to report ability to  grandchild without increase in neck or shoulder pain.   3. Pt to demonstrate ability to sleep through the night without waking up from pain.    Plan  Therapy options: will be seen for skilled therapy services  Planned modality interventions: thermotherapy (hydrocollator packs) and cryotherapy  Planned therapy interventions: flexibility, functional ROM exercises, home exercise program, joint mobilization, therapeutic activities, stretching, strengthening, spinal/joint mobilization, soft tissue mobilization, postural training, neuromuscular re-education, motor coordination training and manual therapy  Frequency: 1x week  Duration in weeks:  8  Treatment plan discussed with: patient        Timed Treatment:  Manual Therapy:         mins  49552;  Therapeutic Exercise:         mins  37551;     Neuromuscular Yao:        mins  18011;    Therapeutic Activity:          mins  04866;     Gait Training:           mins  00481;     Ultrasound:          mins  48753;    Electrical Stimulation:         mins  06567 ( );  Dry Needling          mins self-pay  Iontophoresis          mins 09618    Untimed Treatments:  Electrical Stimulation:         mins  46238 ( );  Dry Needling:                     mins  Ultrasound:                         mins  44059;      Timed Treatment:      mins   Total Treatment:     50   mins    PT SIGNATURE: ARA Muñoz License: [id number]  DATE TREATMENT INITIATED: 2/14/2025    Initial Certification  Certification Period: 5/14/2025  I certify that the therapy services are furnished while this patient is under my care.  The services outlined above are required by this patient, and will be reviewed every 90 days.     PHYSICIAN: Julian Churchill PA-C      DATE:     Please sign and return via fax to [phone].. Thank you, [facility].

## 2025-03-21 ENCOUNTER — OFFICE VISIT (OUTPATIENT)
Dept: ORTHOPEDIC SURGERY | Facility: CLINIC | Age: 65
End: 2025-03-21
Payer: MEDICARE

## 2025-03-21 VITALS
DIASTOLIC BLOOD PRESSURE: 72 MMHG | HEIGHT: 66 IN | BODY MASS INDEX: 38.41 KG/M2 | WEIGHT: 239 LBS | SYSTOLIC BLOOD PRESSURE: 118 MMHG

## 2025-03-21 DIAGNOSIS — M54.2 CHRONIC NECK PAIN: Primary | ICD-10-CM

## 2025-03-21 DIAGNOSIS — M54.12 CERVICAL RADICULOPATHY: ICD-10-CM

## 2025-03-21 DIAGNOSIS — G89.29 CHRONIC NECK PAIN: Primary | ICD-10-CM

## 2025-03-21 NOTE — PROGRESS NOTES
Office Note     Name: Kellie Parks    : 1960     MRN: [id number]     Chief Complaint  Follow-up of the Right Shoulder (States she is doing better, she has some pain but it is not as bad at it was. ) and Follow-up of the Left Shoulder    Subjective     History of Present Illness:  Kellie Parks is a 64 y.o. female presenting today for bilateral shoulder pain follow-up.  Patient states that her shoulders are feeling significantly better and she no longer has any significant pain in her shoulders.  She does continue to have chronic neck pain as well as intermittent paresthesias radiating down both arms.  She states the symptoms are typically worse at night and when waking in the morning.  She notes that she did not do her physical therapy that was ordered at last visit as she wanted to try to do the exercises at home.  She did do 1 visit and then continued with home exercise plan.  She does apply heating pad to her neck and uses a cervical pillow while she sleeps which does provide some relief.      Review of Systems     Past Medical History:   Diagnosis Date    Acid reflux     Arthritis     Chronic kidney disease (CKD)     stage II    Elevated cholesterol     Hyperlipidemia     Hypertension     Hypothyroidism     Osteoarthritis     Snores     Wears contact lenses         Past Surgical History:   Procedure Laterality Date    COLONOSCOPY      COLONOSCOPY N/A 2024    Procedure: COLONOSCOPY with polypectomy;  Surgeon: Cristofer Shah MD;  Location: [facility] ENDOSCOPY;  Service: Gastroenterology;  Laterality: N/A;    HYSTERECTOMY         Family History   Problem Relation Age of Onset    Arthritis Mother     Osteoporosis Mother     Hypertension Mother     Rheum arthritis Mother     Rheum arthritis Father     Prostate cancer Father 76         with bone mets    Hypertension Father     Colon cancer Father 75    Colon cancer Son 17    Kidney failure Son        Social History     Socioeconomic History     "Marital status:    Tobacco Use    Smoking status: Never    Smokeless tobacco: Never   Vaping Use    Vaping status: Never Used   Substance and Sexual Activity    Alcohol use: No    Drug use: No    Sexual activity: Not Currently         Current Outpatient Medications:     fluticasone (FLONASE) 50 MCG/ACT nasal spray, As Needed., Disp: , Rfl:     levothyroxine (SYNTHROID, LEVOTHROID) 150 MCG tablet, Take 1 tablet by mouth Daily., Disp: , Rfl:     loratadine (CLARITIN) 10 MG tablet, Take 1 tablet by mouth As Needed., Disp: , Rfl:     losartan-hydrochlorothiazide (HYZAAR) 50-12.5 MG per tablet, Take 1 tablet by mouth Daily., Disp: , Rfl:     metoprolol succinate XL (TOPROL-XL) 25 MG 24 hr tablet, Take 1 tablet by mouth Daily., Disp: , Rfl:     simvastatin (ZOCOR) 20 MG tablet, Take 1 tablet by mouth Daily., Disp: , Rfl:     No Known Allergies        Objective   /72   Ht 167.6 cm (65.98\")   Wt 108 kg (239 lb)   BMI 38.59 kg/m²            Physical Exam  Back Exam     Tenderness   The patient is experiencing tenderness in the cervical.    Range of Motion   The patient has normal back ROM.    Comments:  Negative Lhermitte's test, positive Spurling's test on left and right, negative shoulder abduction test           Extremity DVT signs are negative by clinical screen.     Independent Review of Radiographic Studies:    No new imaging was done today.    Procedures    Assessment and Plan   Diagnoses and all orders for this visit:    1. Chronic neck pain (Primary)  -     EMG & Nerve Conduction Test  -     Ambulatory Referral to Physical Therapy for Evaluation & Treatment    2. Cervical radiculopathy  -     EMG & Nerve Conduction Test  -     Ambulatory Referral to Physical Therapy for Evaluation & Treatment       Discussion of orthopedic goals  Orthopedic activities reviewed and patient expressed appreciation  Risk, benefits, and merits of treatment alternatives reviewed with the patient and questions " answered  Patient guided on mobility and conditioning exercises  Ice, heat, and/or modalities as needed and beneficial  Physical therapy referral given.  Discussed gentle range of motion activities/exercises for the affected joint with the patient.    Reduced physical activity as appropriate and avoid aggravating activities.     Recommendations/Plan:  Exercise, medications, injections, other patient advice, and return appointment as noted.  Referral: Physical and Occupational Therapy referral.  Test/Studies: Bilateral upper extremity EMG/NCS  Patient and/or guardian(s) were encouraged to call or return to the office for any issues or concerns.    Consider MRI of C-spine at next visit if no improvement.    Return in about 2 months (around 5/21/2025) for Recheck.

## 2025-04-11 ENCOUNTER — PROCEDURE VISIT (OUTPATIENT)
Dept: ORTHOPEDIC SURGERY | Facility: CLINIC | Age: 65
End: 2025-04-11
Payer: MEDICARE

## 2025-04-11 DIAGNOSIS — R20.2 PARESTHESIA: ICD-10-CM

## 2025-04-11 DIAGNOSIS — M54.12 BRACHIAL NEURITIS: ICD-10-CM

## 2025-04-11 DIAGNOSIS — G56.03 CARPAL TUNNEL SYNDROME, BILATERAL: Primary | ICD-10-CM

## 2025-04-17 ENCOUNTER — PATIENT ROUNDING (BHMG ONLY) (OUTPATIENT)
Dept: URGENT CARE | Facility: CLINIC | Age: 65
End: 2025-04-17
Payer: MEDICARE

## (undated) DEVICE — Device

## (undated) DEVICE — VLV SXN AIR/H2O ORCAPOD3 1P/U STRL

## (undated) DEVICE — HYBRID CO2 TUBING/CAP SET FOR OLYMPUS® SCOPES & CO2 SOURCE: Brand: ERBE

## (undated) DEVICE — ENDOSCOPY PORT CONNECTOR FOR OLYMPUS® SCOPES: Brand: ERBE

## (undated) DEVICE — SINGLE-USE POLYPECTOMY SNARE: Brand: CAPTIVATOR II

## (undated) DEVICE — QUICK CATCH IN-LINE SUCTION POLYP TRAP IS USED FOR SUCTION RETRIEVAL OF ENDOSCOPICALLY REMOVED POLYPS.